# Patient Record
Sex: MALE | Race: BLACK OR AFRICAN AMERICAN | Employment: FULL TIME | URBAN - METROPOLITAN AREA
[De-identification: names, ages, dates, MRNs, and addresses within clinical notes are randomized per-mention and may not be internally consistent; named-entity substitution may affect disease eponyms.]

---

## 2019-12-23 ENCOUNTER — ED HISTORICAL/CONVERTED ENCOUNTER (OUTPATIENT)
Dept: OTHER | Age: 49
End: 2019-12-23

## 2020-05-18 ENCOUNTER — OP HISTORICAL/CONVERTED ENCOUNTER (OUTPATIENT)
Dept: OTHER | Age: 50
End: 2020-05-18

## 2020-06-16 ENCOUNTER — OP HISTORICAL/CONVERTED ENCOUNTER (OUTPATIENT)
Dept: OTHER | Age: 50
End: 2020-06-16

## 2020-06-22 ENCOUNTER — OP HISTORICAL/CONVERTED ENCOUNTER (OUTPATIENT)
Dept: OTHER | Age: 50
End: 2020-06-22

## 2020-07-07 VITALS
SYSTOLIC BLOOD PRESSURE: 145 MMHG | HEIGHT: 69 IN | WEIGHT: 165 LBS | BODY MASS INDEX: 24.44 KG/M2 | DIASTOLIC BLOOD PRESSURE: 98 MMHG | TEMPERATURE: 98.2 F

## 2020-07-07 DIAGNOSIS — K64.8 OTHER HEMORRHOIDS: ICD-10-CM

## 2020-07-07 DIAGNOSIS — C61 MALIGNANT NEOPLASM OF PROSTATE (HCC): ICD-10-CM

## 2020-07-07 DIAGNOSIS — Z80.42 FAMILY HISTORY OF MALIGNANT NEOPLASM OF PROSTATE: ICD-10-CM

## 2020-07-07 DIAGNOSIS — R97.20 ELEVATED PROSTATE SPECIFIC ANTIGEN (PSA): ICD-10-CM

## 2020-07-07 RX ORDER — DOCUSATE SODIUM 100 MG/1
100 CAPSULE, LIQUID FILLED ORAL 2 TIMES DAILY
COMMUNITY
End: 2021-01-15 | Stop reason: ALTCHOICE

## 2020-07-07 RX ORDER — HYDROCODONE BITARTRATE AND ACETAMINOPHEN 5; 325 MG/1; MG/1
TABLET ORAL
COMMUNITY
End: 2021-01-15 | Stop reason: ALTCHOICE

## 2020-07-07 RX ORDER — FACIAL-BODY WIPES
10 EACH TOPICAL
COMMUNITY
End: 2021-01-15 | Stop reason: ALTCHOICE

## 2020-08-03 DIAGNOSIS — R97.20 ELEVATED PROSTATE SPECIFIC ANTIGEN (PSA): ICD-10-CM

## 2020-08-03 DIAGNOSIS — C61 MALIGNANT NEOPLASM OF PROSTATE (HCC): Primary | ICD-10-CM

## 2020-08-03 PROBLEM — R32 URINARY INCONTINENCE: Status: ACTIVE | Noted: 2020-08-03

## 2020-08-03 PROBLEM — K64.9 HEMORRHOIDS: Status: ACTIVE | Noted: 2020-07-07

## 2020-08-03 PROBLEM — R10.2 PELVIC PAIN: Status: ACTIVE | Noted: 2020-08-03

## 2020-08-04 ENCOUNTER — OFFICE VISIT (OUTPATIENT)
Dept: UROLOGY | Age: 50
End: 2020-08-04
Payer: COMMERCIAL

## 2020-08-04 VITALS — WEIGHT: 155 LBS | TEMPERATURE: 99.6 F | HEIGHT: 69 IN | BODY MASS INDEX: 22.96 KG/M2

## 2020-08-04 DIAGNOSIS — R97.20 ELEVATED PROSTATE SPECIFIC ANTIGEN (PSA): ICD-10-CM

## 2020-08-04 DIAGNOSIS — R10.2 PELVIC PAIN: ICD-10-CM

## 2020-08-04 DIAGNOSIS — K64.9 HEMORRHOIDS, UNSPECIFIED HEMORRHOID TYPE: ICD-10-CM

## 2020-08-04 DIAGNOSIS — R32 URINARY INCONTINENCE, UNSPECIFIED TYPE: ICD-10-CM

## 2020-08-04 DIAGNOSIS — C61 MALIGNANT NEOPLASM OF PROSTATE (HCC): ICD-10-CM

## 2020-08-04 DIAGNOSIS — Z80.42 FAMILY HISTORY OF MALIGNANT NEOPLASM OF PROSTATE: ICD-10-CM

## 2020-08-04 LAB
BILIRUB UR QL STRIP: NEGATIVE
GLUCOSE UR-MCNC: NEGATIVE MG/DL
KETONES P FAST UR STRIP-MCNC: NORMAL MG/DL
PH UR STRIP: 6 [PH] (ref 4.6–8)
PROT UR QL STRIP: NORMAL
PSA SERPL-MCNC: 0.9 NG/ML (ref 0–4)
SP GR UR STRIP: 1.02 (ref 1–1.03)
UA UROBILINOGEN AMB POC: NORMAL (ref 0.2–1)
URINALYSIS CLARITY POC: CLEAR
URINALYSIS COLOR POC: YELLOW
URINE BLOOD POC: NORMAL
URINE LEUKOCYTES POC: NEGATIVE
URINE NITRITES POC: NEGATIVE

## 2020-08-04 PROCEDURE — 99024 POSTOP FOLLOW-UP VISIT: CPT | Performed by: UROLOGY

## 2020-08-04 PROCEDURE — 81003 URINALYSIS AUTO W/O SCOPE: CPT | Performed by: UROLOGY

## 2020-08-04 RX ORDER — FLUCONAZOLE 200 MG/1
200 TABLET ORAL DAILY
Qty: 7 TAB | Refills: 0 | Status: SHIPPED | OUTPATIENT
Start: 2020-08-04 | End: 2020-08-11

## 2020-08-04 RX ORDER — AMITRIPTYLINE HYDROCHLORIDE 10 MG/1
10 TABLET, FILM COATED ORAL
Qty: 30 TAB | Refills: 1 | Status: SHIPPED | OUTPATIENT
Start: 2020-08-04 | End: 2021-01-15 | Stop reason: ALTCHOICE

## 2020-08-04 NOTE — ASSESSMENT & PLAN NOTE
PSA was 67 preop, decreasing. Recheck PSA.      Lab Results   Component Value Date/Time    Prostate Specific Ag 0.9 08/03/2020 10:14 AM

## 2020-08-04 NOTE — PROGRESS NOTES
HISTORY OF PRESENT ILLNESS  Altamese Koyanagi is a 47 y.o. male. Mr. Pradeep Dolan is having a burning discomfort with voiding and reports an almost constant leak. He has hemorrhoid pains. He is moving his bowels after eating. He has burning with urination. Urine culture 7/14/20 negative. U/a today clear. He gets pain up his rectum and buttocks. Advil, tylenol and hydrocodone have not given him adequate relief. PSA has decreased from 67 to 0.9 postop. Problem List  Never Reviewed          Codes Class Noted    Pelvic pain ICD-10-CM: R10.2  ICD-9-CM: [id number]  8/3/2020    Overview Signed 8/3/2020 10:31 AM by Ava Avery NP     He had pain in the pelvis and under the scrotum following prostatectomy, with dysuria. Started on Cipro on 7- (rule out infection). No growth on culture. Urinary incontinence ICD-10-CM: R32  ICD-9-CM: 788.30  8/3/2020    Overview Signed 8/3/2020 10:33 AM by Ava Avery NP     He is s/p prostatectomy on 6-. He has been educated on [employer]. Family history of malignant neoplasm of prostate ICD-10-CM: Z80.42  ICD-9-CM: V16.42  7/7/2020    Overview Addendum 8/3/2020 10:29 AM by Ava Avery NP     He has a family history of prostate cancer- father. His children should get their cancer screenings earlier in life and not delay. PCa screening age 35 for his sons. Early mammogram and exams for their daughter. Hemorrhoids ICD-10-CM: K64.9  ICD-9-CM: 455.6  7/7/2020    Overview Addendum 8/3/2020 10:32 AM by Ava Avery NP     He has difficulty with BM and hemorrhoids. At times he has diarrhea. He often strains and notes blood on the toliet tissue afterward. Malignant neoplasm of prostate [facility]) ICD-10-CM: C61  ICD-9-CM: 087  7/7/2020    Overview Addendum 8/4/2020 10:33 AM by Ava Avery NP     Biopsy 5/1/2020 with Jaz's 7 (3+4 and 4+3) disease in 11/12 cores with up to 90% involvement.  Clinical stage T2.  CT scan and bones scan done 5/18/2020 with no evidence of metastatic disease. He is s/p prostatectomy and PLND on 6/22/20. Pathology reports Jaz's 4+5 disease with extensive bilateral involvement (80%) with extension into the seminal vesicle, and the extraprostatic soft tissue, with a positive posterior margin (bilaterally) as well as invasion into the bladder base margins. Clear lymph nodes. Adjuvant therapy with ADT recommended. PSA 8/3/2020 was 0.9                 Elevated prostate specific antigen (PSA) ICD-10-CM: R97.20  ICD-9-CM: 790.93  7/7/2020    Overview Signed 7/7/2020  3:06 PM by Bo Ron LPN     Referred from Dr. Sindhu Vasquez. PSA on 2/24/2020 is reported at 65.4 NG/ml. PSA on 4/21/2020 is 67.8 NG/ml, free 8.8%. He is s/p prostatectomy prostatectomy and PLND on 6/22/20. Pathology reports Jaz's 4+5 disease with extensive bilateral involvement (80%) with extension into the seminal vesicle, and the extraprostatic soft tissue, with a positive posterior margin (bilaterally) as well as invasion into the bladder base margins. Clear lymph nodes. Adjuvant therapy with ADT recommended. PSA 8/3/2020 was 0.9                    Review of Systems   Gastrointestinal:        Per HPI   Genitourinary:        Per HPI   All other systems reviewed and are negative. Physical Exam  Vitals signs reviewed. Constitutional:       General: He is not in acute distress. Appearance: Normal appearance. HENT:      Head: Normocephalic and atraumatic. Nose: Nose normal.      Mouth/Throat:      Mouth: Mucous membranes are moist.   Eyes:      Extraocular Movements: Extraocular movements intact. Pupils: Pupils are equal, round, and reactive to light. Neck:      Musculoskeletal: Normal range of motion. Pulmonary:      Effort: Pulmonary effort is normal. No respiratory distress. Breath sounds: Normal breath sounds. No wheezing or rhonchi.    Abdominal:      General: There is no distension. Palpations: Abdomen is soft. Musculoskeletal: Normal range of motion. Skin:     General: Skin is warm and dry. Neurological:      General: No focal deficit present. Mental Status: He is alert and oriented to person, place, and time. Mental status is at baseline. Psychiatric:         Mood and Affect: Mood normal.         Behavior: Behavior normal.           ASSESSMENT and PLAN  Diagnoses and all orders for this visit:    1. Malignant neoplasm of prostate [facility])  Assessment & Plan:    PSA was 67 preop, decreasing. Recheck PSA. Lab Results   Component Value Date/Time    Prostate Specific Ag 0.9 08/03/2020 10:14 AM       Orders:  -     PSA, DIAGNOSTIC (PROSTATE SPECIFIC AG)  -     amitriptyline (ELAVIL) 10 mg tablet; Take 1 Tab by mouth nightly. Indications: neuropathic pain    2. Urinary incontinence, unspecified type    3. Pelvic pain  Assessment & Plan: We will try imipramine for neuropathic pain. He has GI related pain as well. I will send him for a GI referral.         Orders:  -     amitriptyline (ELAVIL) 10 mg tablet; Take 1 Tab by mouth nightly. Indications: neuropathic pain    4. Family history of malignant neoplasm of prostate    5. Hemorrhoids, unspecified hemorrhoid type    6. Elevated prostate specific antigen (PSA)  Assessment & Plan:  Decreased but still elevated postop. I will recheck in 1m. Other orders  -     fluconazole (DIFLUCAN) 200 mg tablet; Take 1 Tab by mouth daily for 7 days. FDA advises cautious prescribing of oral fluconazole in pregnancy.        Ara Dandy, MD

## 2020-08-04 NOTE — ASSESSMENT & PLAN NOTE
We will try amitryptiline for neuropathic pain. He has GI related pain as well. I will send him for a GI referral.     We discussed cystoscopy to evaluate for urethral issues.

## 2020-08-18 VITALS
BODY MASS INDEX: 24.53 KG/M2 | HEIGHT: 69 IN | WEIGHT: 165.6 LBS | SYSTOLIC BLOOD PRESSURE: 145 MMHG | DIASTOLIC BLOOD PRESSURE: 98 MMHG | OXYGEN SATURATION: 98 % | HEART RATE: 98 BPM

## 2020-08-18 PROBLEM — Z82.49 FAMILY HISTORY OF CARDIOVASCULAR DISEASE: Status: ACTIVE | Noted: 2020-08-18

## 2020-08-18 PROBLEM — R03.0 ELEVATED BLOOD PRESSURE READING WITHOUT DIAGNOSIS OF HYPERTENSION: Status: ACTIVE | Noted: 2020-08-18

## 2020-08-18 PROBLEM — Z83.3 FAMILY HISTORY OF TYPE 2 DIABETES MELLITUS: Status: ACTIVE | Noted: 2020-08-18

## 2020-08-18 PROBLEM — E87.6 HYPOKALEMIA: Status: ACTIVE | Noted: 2020-08-18

## 2020-08-18 PROBLEM — F10.90 ALCOHOL INTAKE ABOVE RECOMMENDED SENSIBLE LIMITS: Status: ACTIVE | Noted: 2020-08-18

## 2020-08-18 PROBLEM — F10.10 ALCOHOL ABUSE, CONTINUOUS DRINKING BEHAVIOR: Status: ACTIVE | Noted: 2020-08-18

## 2020-08-18 PROBLEM — Z82.3 FAMILY HISTORY OF STROKE: Status: ACTIVE | Noted: 2020-08-18

## 2020-08-18 PROBLEM — F17.200 NICOTINE DEPENDENCE: Status: ACTIVE | Noted: 2020-08-18

## 2020-08-18 PROBLEM — Z13.220 SCREENING CHOLESTEROL LEVEL: Status: ACTIVE | Noted: 2020-08-18

## 2020-08-18 RX ORDER — CEPHALEXIN 500 MG/1
500 CAPSULE ORAL 4 TIMES DAILY
COMMUNITY
End: 2021-01-15 | Stop reason: ALTCHOICE

## 2020-08-18 RX ORDER — ISOPROPYL ALCOHOL IN GLYCERIN 95 %-5 %
DROPS OTIC (EAR)
COMMUNITY
End: 2022-01-12 | Stop reason: ALTCHOICE

## 2020-08-18 RX ORDER — SODIUM PHOSPHATE,MONO-DIBASIC 19G-7G/118
1 ENEMA (ML) RECTAL
COMMUNITY
End: 2021-01-15 | Stop reason: ALTCHOICE

## 2020-08-18 RX ORDER — OMEPRAZOLE 40 MG/1
40 CAPSULE, DELAYED RELEASE ORAL DAILY
COMMUNITY
End: 2021-01-15 | Stop reason: ALTCHOICE

## 2020-08-18 RX ORDER — CIPROFLOXACIN 500 MG/1
TABLET ORAL 2 TIMES DAILY
COMMUNITY
End: 2021-01-15 | Stop reason: ALTCHOICE

## 2020-09-05 LAB — PSA SERPL-MCNC: 1.3 NG/ML (ref 0–4)

## 2020-09-08 ENCOUNTER — OFFICE VISIT (OUTPATIENT)
Dept: UROLOGY | Age: 50
End: 2020-09-08
Payer: COMMERCIAL

## 2020-09-08 VITALS — TEMPERATURE: 97.8 F | HEIGHT: 69 IN | BODY MASS INDEX: 23.7 KG/M2 | WEIGHT: 160 LBS

## 2020-09-08 DIAGNOSIS — Z80.42 FAMILY HISTORY OF PROSTATE CANCER: ICD-10-CM

## 2020-09-08 DIAGNOSIS — R97.21 INCREASING PROSTATE SPECIFIC ANTIGEN (PSA) LEVEL AFTER TREATMENT FOR MALIGNANT NEOPLASM OF PROSTATE: ICD-10-CM

## 2020-09-08 DIAGNOSIS — C61 MALIGNANT NEOPLASM OF PROSTATE (HCC): Primary | ICD-10-CM

## 2020-09-08 DIAGNOSIS — R32 URINARY INCONTINENCE, UNSPECIFIED TYPE: ICD-10-CM

## 2020-09-08 LAB
AVG FLOW RATE POC: 4 ML/SECONDS
BILIRUB UR QL STRIP: NEGATIVE
FLOW TIME POC: 33 SECONDS
GLUCOSE UR-MCNC: NEGATIVE MG/DL
KETONES P FAST UR STRIP-MCNC: NEGATIVE MG/DL
MAX FLOW RATE POC: 5 ML/SECONDS
PH UR STRIP: 5.5 [PH] (ref 4.6–8)
PROT UR QL STRIP: NORMAL
PVR POC: 0 CC
SP GR UR STRIP: 1.02 (ref 1–1.03)
TIME TO MAX, POC: 4 SECONDS
UA UROBILINOGEN AMB POC: NORMAL (ref 0.2–1)
URINALYSIS CLARITY POC: CLEAR
URINALYSIS COLOR POC: YELLOW
URINE BLOOD POC: NEGATIVE
URINE LEUKOCYTES POC: NEGATIVE
URINE NITRITES POC: NEGATIVE
VOIDED VOLUME POC: 134 ML
VOIDING TIME POC: 34 SECONDS

## 2020-09-08 PROCEDURE — 51741 ELECTRO-UROFLOWMETRY FIRST: CPT | Performed by: UROLOGY

## 2020-09-08 PROCEDURE — 52000 CYSTOURETHROSCOPY: CPT | Performed by: UROLOGY

## 2020-09-08 PROCEDURE — 51798 US URINE CAPACITY MEASURE: CPT | Performed by: UROLOGY

## 2020-09-08 PROCEDURE — 96402 CHEMO HORMON ANTINEOPL SQ/IM: CPT

## 2020-09-08 PROCEDURE — 51726 COMPLEX CYSTOMETROGRAM: CPT | Performed by: UROLOGY

## 2020-09-08 PROCEDURE — 81003 URINALYSIS AUTO W/O SCOPE: CPT | Performed by: UROLOGY

## 2020-09-08 NOTE — PATIENT INSTRUCTIONS
ADT  Androgen Deprivation Therapy  What you need to know to get started    What is ADT?  ADT is a type of hormone therapy (also called androgen deprivation therapy or ADT) is part of the standard of care for advanced and metastatic prostate cancer.  Androgen: male sex hormone, such as testosterone.  ADT therapy usually consists of a shot/injection that lowers your testosterone levels. It is typically given every 4 months (there are other dosing schedules, depending on the formulation). How does it work for Prostate Cancer?  Prostate cancer cells usually require androgen hormones, such as testosterone, to grow. ADT reduces the levels of androgen hormones to prevent the prostate cancer cells from growing or spreading.  Your brain produces a substance called LHRH (a hormone) that activates another hormone, LH. LH travels to the testicles and causes the formation of testosterone.  ADT lowers the amount of testosterone made by the testicles by inhibiting the formation of LH in the brain causing the testicles not to produce testosterone. These drugs are called LHRH agonists: leuprolide (Lupron), goserelin (Zoladex), triptorelin (Trelstar), and histrelin (Vantas or Supprelin LA).  Drugs called LHRH antagonists prevent LHRH from binding to receptors in order to create LH. With no LH, the testicles stop producing testosterone. Degarelix or Edgar Tho is an example of this drug. It's dosing schedule is different (usually monthly, or every 28 days). How do I know if it is working? Your provider will continue to check a PSA level every few months (typically with your injections). An increase in PSA may indicated that your cancer has started growing again. If your ADT is keeping your androgen levels low and your PSA is going up, that let's your provider know that your cancer is resistant to ADT. You may require additional treatment at that time. What is castrate-resistant prostate cancer?    Many prostate cancers will respond to ADT at first, but eventually then stop responding to this treatment. When this happens, the cancer is considered castrate resistant. These types of prostate cancers can grow despite low levels of androgens. If you have a castrate resistant prostate cancer, additional treatment is required. You will likely stay on your ADT to avoid the increase in testosterone level, which can lead to tumor progression. Side effects of ADT:  Side effects can be managed/treated.  Loss of interest in sex (low libido)   Erectile dysfunction   Hot flashes   Loss of muscle mass and physical strength   Changes in insulin resistance or blood lipids   Weight gain   Mood swings   Fatigue   Growth of breast tissue   Loss of bone density or bone fractures    What is Prosteon?  Bone health supplement used during ADT therapy to provide nutrient support.  Contains calcium citrate, high dose vitamin D3, vitamin K2, magnesium and other minerals that promote healthy bones.  Prosteon contains a high dose of vitamin D3, which aids in the absorption of calcium and calcium citrate, which is more bioavailable (easier for your body to absorb and use) in comparison to other supplements.  Take 2 tablets, twice daily, or as recommended by your provider. Take with food.  Meant for daily, long term use.  There are drug interactions. Make sure to discuss your current medication list with your provider.  There are no side effects associated with Prosteon.  Artificial, dye, and gluten free.  https://[employer]/products/prosteon-bone-health-supplement   Use code 80045 for a discount    
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
## 2020-09-08 NOTE — ASSESSMENT & PLAN NOTE
Lab Results   Component Value Date/Time    Prostate Specific Ag 1.3 09/04/2020 03:14 PM     PSA detectable and increasing after prostatectomy. I recommend adjuvant therapy with ADT.

## 2020-09-08 NOTE — ASSESSMENT & PLAN NOTE
Doing better with Kegels. Improved after bowels moving better. Slow stream but emptying well. No residual pelvic pain.

## 2020-09-17 PROBLEM — Z13.220 SCREENING CHOLESTEROL LEVEL: Status: RESOLVED | Noted: 2020-08-18 | Resolved: 2020-09-17

## 2021-01-11 ENCOUNTER — TELEPHONE (OUTPATIENT)
Dept: UROLOGY | Age: 51
End: 2021-01-11

## 2021-01-11 DIAGNOSIS — C61 MALIGNANT NEOPLASM OF PROSTATE (HCC): Primary | ICD-10-CM

## 2021-01-12 LAB — PSA SERPL-MCNC: <0.1 NG/ML (ref 0–4)

## 2021-01-15 ENCOUNTER — OFFICE VISIT (OUTPATIENT)
Dept: UROLOGY | Age: 51
End: 2021-01-15
Payer: COMMERCIAL

## 2021-01-15 VITALS — TEMPERATURE: 97.1 F | HEIGHT: 70 IN | BODY MASS INDEX: 22.9 KG/M2 | WEIGHT: 160 LBS

## 2021-01-15 DIAGNOSIS — R32 URINARY INCONTINENCE, UNSPECIFIED TYPE: ICD-10-CM

## 2021-01-15 DIAGNOSIS — N52.9 IMPOTENCE: ICD-10-CM

## 2021-01-15 DIAGNOSIS — C61 MALIGNANT NEOPLASM OF PROSTATE (HCC): Primary | ICD-10-CM

## 2021-01-15 DIAGNOSIS — R23.2 HOT FLASHES: ICD-10-CM

## 2021-01-15 DIAGNOSIS — K64.9 HEMORRHOIDS, UNSPECIFIED HEMORRHOID TYPE: ICD-10-CM

## 2021-01-15 DIAGNOSIS — R97.21 INCREASING PROSTATE SPECIFIC ANTIGEN (PSA) LEVEL AFTER TREATMENT FOR MALIGNANT NEOPLASM OF PROSTATE: ICD-10-CM

## 2021-01-15 PROCEDURE — 96402 CHEMO HORMON ANTINEOPL SQ/IM: CPT | Performed by: NURSE PRACTITIONER

## 2021-01-15 RX ORDER — SILDENAFIL 100 MG/1
100 TABLET, FILM COATED ORAL AS NEEDED
Qty: 15 TAB | Refills: 3 | Status: SHIPPED | OUTPATIENT
Start: 2021-01-15 | End: 2021-11-21
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
## 2021-01-15 NOTE — PATIENT INSTRUCTIONS
ADT 
Androgen Deprivation Therapy What you need to know to get started What is ADT? ? ADT is a type of hormone therapy (also called androgen deprivation therapy or ADT) is part of the standard of care for advanced and metastatic prostate cancer. ? Androgen: male sex hormone, such as testosterone. ? ADT therapy usually consists of a shot/injection that lowers your testosterone levels. It is typically given every 3 months (there are other dosing schedules, depending on the formulation). How does it work for Prostate Cancer? ? Prostate cancer cells usually require androgen hormones, such as testosterone, to grow. ADT reduces the levels of androgen hormones to prevent the prostate cancer cells from growing or spreading. ? Your brain produces a substance called LHRH (a hormone) that activates another hormone, LH. LH travels to the testicles and causes the formation of testosterone. ? ADT lowers the amount of testosterone made by the testicles by inhibiting the formation of LH in the brain causing the testicles not to produce testosterone. These drugs are called LHRH agonists: leuprolide (Lupron), goserelin (Zoladex), triptorelin (Trelstar), and histrelin (Vantas or Supprelin LA). ? Drugs called LHRH antagonists prevent LHRH from binding to receptors in order to create LH. With no LH, the testicles stop producing testosterone. Degarelix or Viviann Netter is an example of this drug. It's dosing schedule is different (usually monthly, or every 28 days). How do I know if it is working? Your provider will continue to check a PSA level every few months (typically with your injections). An increase in PSA may indicated that your cancer has started growing again. If your ADT is keeping your androgen levels low and your PSA is going up, that let's your provider know that your cancer is resistant to ADT. You may require additional treatment at that time. What is castrate-resistant prostate cancer? Many prostate cancers will respond to ADT at first, but eventually then stop responding to this treatment. When this happens, the cancer is considered castrate resistant. These types of prostate cancers can grow despite low levels of androgens. If you have a castrate resistant prostate cancer, additional treatment is required. You will likely stay on your ADT to avoid the increase in testosterone level, which can lead to tumor progression. Side effects of ADT: Side effects can be managed/treated. ? Loss of interest in sex (low libido) ? Erectile dysfunction ? Hot flashes ? Loss of muscle mass and physical strength ? Changes in insulin resistance or blood lipids ? Weight gain ? Mood swings ? Fatigue ? Growth of breast tissue ? Loss of bone density or bone fractures What is Prosteon? ? Bone health supplement used during ADT therapy to provide nutrient support. ? Contains calcium citrate, high dose vitamin D3, vitamin K2, magnesium and other minerals that promote healthy bones. ? Prosteon contains a high dose of vitamin D3, which aids in the absorption of calcium and calcium citrate, which is more bioavailable (easier for your body to absorb and use) in comparison to other supplements. ? Take 2 tablets, twice daily, or as recommended by your provider. Take with food. ? Meant for daily, long term use. ? There are drug interactions. Make sure to discuss your current medication list with your provider. ? There are no side effects associated with Prosteon. ? Artificial, dye, and gluten free. ? https://1-[employer] All/products/prosteon-bone-health-supplement ? Use [id number] for a discount What is Megace? ? A man-made derivative of the naturally occurring steroid hormone progesterone. ? Can help to treat hot flashes in men on ADT. ? Minimal side effects.

## 2021-01-15 NOTE — ASSESSMENT & PLAN NOTE
He was referred to Dr. Anthony Cosme, GI. His screening colonoscopy has been postponed secondary to rising COVID concerns per his report. He notes Dr. Anthony Cosme did give him a RX that has significantly helped his symptoms.

## 2021-01-17 PROBLEM — R23.2 HOT FLASHES: Status: ACTIVE | Noted: 2021-01-17

## 2021-01-17 PROBLEM — N52.9 IMPOTENCE: Status: ACTIVE | Noted: 2021-01-17

## 2021-01-17 NOTE — ASSESSMENT & PLAN NOTE
PSA undetectable now on Lupron (started 9/8/2020). He is on the 4 month dosing schedule. Plan for injection today with f/u in 4 months/PSA prior.

## 2021-01-17 NOTE — ASSESSMENT & PLAN NOTE
He feels he has great control. He does use a pad for safety but only rarely has a small volume leak. He drinks water all day, so he voids often. He is cautious not to prolong his urge.

## 2021-01-17 NOTE — ASSESSMENT & PLAN NOTE
He is functioning sexually. He notes he is able to have penetrative intercourse most times. He thinks his tumescence ranges from 60-70%. He inquires about sildenafil or \"male enhancement drugs\" he has seen commercials for. We discussed the importance of avoiding such drugs as they may/may not contain testosterone which is contraindicated secondary to his prostate cancer. He expresses understanding. He is given a RX for sildenafil 100 mg. He is encouraged to start with 50 mg and titrate up if necessary. We discussed the new medication, potential side effects (ranging from congestion, headache, flushing to priapism). We discussed proper dosing of the medication and reviewed his medical history and current medications to identify any contraindications. He expressed understanding. He will call me if he has any problems with the new prescription. His wife is also on the phone for this discussion.

## 2021-01-17 NOTE — ASSESSMENT & PLAN NOTE
Tolerable. He may try soy milk. He is more bothered by weight gain and is cautioned about avoiding sugary/fatty foods.

## 2021-05-15 DIAGNOSIS — C61 MALIGNANT NEOPLASM OF PROSTATE (HCC): ICD-10-CM

## 2021-05-17 ENCOUNTER — TELEPHONE (OUTPATIENT)
Dept: UROLOGY | Age: 51
End: 2021-05-17

## 2021-05-17 NOTE — TELEPHONE ENCOUNTER
Tried getting Lupron Auth: was told that patients insurance covers Pharmacy but Lipron needs medical coverage becuz its injected in the Dr's office/buy and bill. Im trying to get ahold of patient to see if they have new insurance.

## 2021-05-17 NOTE — TELEPHONE ENCOUNTER
Spoke with patient wife needing to get husbands updated insurance information and she gave me the ID number and type of insurance.  She said it was the same one that he has always had

## 2021-05-17 NOTE — TELEPHONE ENCOUNTER
Called member services, this patient ha pharmacy coverage not medical.  Not sure this is correct, how can one have pharmacy covererage without medical covereage and have an apt?   Jackie said she would try tomorrow 5/18/21

## 2021-05-18 LAB — PSA SERPL-MCNC: 0.1 NG/ML (ref 0–4)

## 2021-05-24 NOTE — PROGRESS NOTES
HISTORY OF PRESENT ILLNESS  Danielito Alvarado is a 45 y.o. male. Chief Complaint   Patient presents with    Follow-up    Prostate Cancer    Elevated PSA     He is s/p prostatectomy and PLND on 6/22/20. Pathology reports Addison's 4+5 disease with extensive bilateral involvement (80%) with extension into the seminal vesicle, and the extraprostatic soft tissue, with a positive posterior margin (bilaterally) as well as invasion into the bladder base margins. Clear lymph nodes. Adjuvant therapy with ADT recommended. Started Lupron on 9/8/2020. PSA 8/3/2020 was 0.9  PSA 9/4/2020: 1.3  PSA 1/11/2021: undetectable. PSA 5/17/21: 0.1    He notes he \"hates\" the lupron injections. He tells me he was unable to sit correctly after the last one and had buttock pain x 2 weeks following administration. He does not know if he had a knot or inflammation at the site. He did respond to sildenafil. He may want to pursue other options in the future but he and his wife are happy at this time. Chronic Conditions Addressed Today     1. Malignant neoplasm of prostate [facility]) - Primary     Overview      Biopsy 5/1/2020 with Jaz's 7 (3+4 and 4+3) disease in 11/12 cores with up to 90% involvement. Clinical stage T2.  CT scan and bones scan done 5/18/2020 with no evidence of metastatic disease. He is s/p prostatectomy and PLND on 6/22/20. Pathology reports Addison's 4+5 disease with extensive bilateral involvement (80%) with extension into the seminal vesicle, and the extraprostatic soft tissue, with a positive posterior margin (bilaterally) as well as invasion into the bladder base margins. Clear lymph nodes. Adjuvant therapy with ADT recommended. Started Lupron on 9/8/2020. PSA 8/3/2020 was 0.9  PSA 9/4/2020: 1.3  PSA 1/11/2021: undetectable. PSA 5/17/21: 0.1              Current Assessment & Plan      PSA detectable on Lupron at 0.1. Continue observation for now. Lupron injection today, repeat PSA in 4 months. 2. Increasing prostate specific antigen (PSA) level after treatment for malignant neoplasm of prostate     Overview      Referred from Dr. Yevonne Gitelman. PSA on 2/24/2020 is reported at 65.4 NG/ml. PSA on 4/21/2020 is 67.8 NG/ml, free 8.8%. He is s/p prostatectomy and PLND on 6/22/20. PSA 1.3 9/4/2020  Started Lupron on 9/8/2020. PSA 1/11/2021: undetectable. Current Assessment & Plan      Continue Lupron. Observe PSA for second recurrent value. PSA and f/u in 3 months. 3. Urinary incontinence     Overview      He is s/p prostatectomy on 6-. He has been educated on kegels. 1/17/21: great control, no pad usage. Rare, small volume leak. He does not prolong the urge. 4. Impotence     Overview      1/17/2021: He is functioning sexually. He notes he is able to have penetrative intercourse most times. He thinks his tumescence ranges from 60-70%. He inquires about sildenafil or \"male enhancement drugs\" he has seen commercials for. We discussed the importance of avoiding such drugs as they may/may not contain testosterone which is contraindicated secondary to his prostate cancer. He expresses understanding. He was given a RX for sildenafil 100 mg. Instructed to start at 50 mg and titrate upward if necessary. Patient denies the symptoms of COVID-19 per routine screening guidelines.   ROS    Past Medical History:  PMHx (including negatives):  has a past medical history of Alcohol abuse, continuous drinking behavior (8/18/2020), Alcohol intake above recommended sensible limits (8/18/2020), Elevated blood pressure reading without diagnosis of hypertension (8/18/2020), Elevated prostate specific antigen (PSA) (7/7/2020), Family history of cardiovascular disease (8/18/2020), Family history of malignant neoplasm of prostate (7/7/2020), Family history of stroke (8/18/2020), Family history of type 2 diabetes mellitus (8/18/2020), Hypokalemia (8/18/2020), Malignant neoplasm of prostate ([facility] [address] (7/7/2020), Nicotine dependence (8/18/2020), Other hemorrhoids (7/7/2020), and Screening cholesterol level (8/18/2020). PSurgHx:  has a past surgical history that includes hx hip arthroscopy; ir bx lymph node superficial (06/22/2020); hx radical prostatectomy; biopsy prostate; hx hip replacement; hx amputation toe; and hx urological (09/08/2020). PSocHx:  reports that he has been smoking. He has a 10.00 pack-year smoking history. He has never used smokeless tobacco. He reports current alcohol use of about 10.0 standard drinks of alcohol per week. He reports current drug use. Drug: Marijuana. ASSESSMENT and PLAN  Diagnoses and all orders for this visit:    1. Malignant neoplasm of prostate [facility])  Assessment & Plan:  PSA detectable on Lupron at 0.1. Continue observation for now. Lupron injection today, repeat PSA in 4 months. He should call/come back to the office if he has a localized reaction to the injection or if the injection site is bothersome in any manner. He expressed understanding. Orders:  -     PSA, DIAGNOSTIC (PROSTATE SPECIFIC AG); Future    2. Urinary incontinence, unspecified type  Assessment & Plan:  Stable, no issue with control. 3. Impotence  Assessment & Plan:  He did well with the sildenafil. He may want to explore other alternative options in the future. 4. Increasing prostate specific antigen (PSA) level after treatment for malignant neoplasm of prostate  Assessment & Plan:  Continue Lupron. Observe PSA for second recurrent value. PSA and f/u in 3 months. Follow-up and Dispositions    · Return in about 4 months (around 9/26/2021) for with Dr. John Sy, Lupron, PSA prior.          Jacinto Torres NP

## 2021-05-24 NOTE — ASSESSMENT & PLAN NOTE
PSA detectable on Lupron at 0.1. Continue observation for now. Lupron injection today, repeat PSA in 4 months. He should call/come back to the office if he has a localized reaction to the injection or if the injection site is bothersome in any manner. He expressed understanding.

## 2021-05-26 ENCOUNTER — OFFICE VISIT (OUTPATIENT)
Dept: UROLOGY | Age: 51
End: 2021-05-26
Payer: COMMERCIAL

## 2021-05-26 VITALS
RESPIRATION RATE: 12 BRPM | SYSTOLIC BLOOD PRESSURE: 122 MMHG | OXYGEN SATURATION: 96 % | TEMPERATURE: 96.4 F | HEART RATE: 113 BPM | HEIGHT: 70 IN | WEIGHT: 160 LBS | BODY MASS INDEX: 22.9 KG/M2 | DIASTOLIC BLOOD PRESSURE: 81 MMHG

## 2021-05-26 DIAGNOSIS — C61 MALIGNANT NEOPLASM OF PROSTATE (HCC): Primary | ICD-10-CM

## 2021-05-26 DIAGNOSIS — R97.21 INCREASING PROSTATE SPECIFIC ANTIGEN (PSA) LEVEL AFTER TREATMENT FOR MALIGNANT NEOPLASM OF PROSTATE: ICD-10-CM

## 2021-05-26 DIAGNOSIS — N52.9 IMPOTENCE: ICD-10-CM

## 2021-05-26 DIAGNOSIS — R32 URINARY INCONTINENCE, UNSPECIFIED TYPE: ICD-10-CM

## 2021-05-26 PROCEDURE — 96402 CHEMO HORMON ANTINEOPL SQ/IM: CPT | Performed by: NURSE PRACTITIONER

## 2021-05-26 NOTE — PROGRESS NOTES
Chief Complaint   Patient presents with    Follow-up    Prostate Cancer    Elevated PSA       1. Have you been to the ER, urgent care clinic since your last visit? Hospitalized since your last visit? No    2. Have you seen or consulted any other health care providers outside of the [address] [place] [address] since your last visit? Include any pap smears or colon screening.  No    Visit Vitals  /81 (BP 1 Location: Left upper arm, BP Patient Position: Sitting, BP Cuff Size: Adult)   Pulse (!) 113   Temp (!) 96.4 °F (35.8 °C) (Temporal)   Resp 12   Ht 5' 10\" (1.778 m)   Wt 160 lb (72.6 kg)   SpO2 96%   BMI 22.96 kg/m²

## 2021-09-24 LAB — PSA SERPL-MCNC: 1.1 NG/ML (ref 0–4)

## 2021-09-27 DIAGNOSIS — C61 MALIGNANT NEOPLASM OF PROSTATE (HCC): ICD-10-CM

## 2021-09-28 ENCOUNTER — OFFICE VISIT (OUTPATIENT)
Dept: UROLOGY | Age: 51
End: 2021-09-28
Payer: COMMERCIAL

## 2021-09-28 VITALS
BODY MASS INDEX: 24.2 KG/M2 | DIASTOLIC BLOOD PRESSURE: 98 MMHG | HEIGHT: 70 IN | WEIGHT: 169 LBS | SYSTOLIC BLOOD PRESSURE: 180 MMHG | RESPIRATION RATE: 22 BRPM | OXYGEN SATURATION: 98 % | TEMPERATURE: 96.5 F | HEART RATE: 98 BPM

## 2021-09-28 DIAGNOSIS — R32 URINARY INCONTINENCE, UNSPECIFIED TYPE: ICD-10-CM

## 2021-09-28 DIAGNOSIS — R97.21 INCREASING PROSTATE SPECIFIC ANTIGEN (PSA) LEVEL AFTER TREATMENT FOR MALIGNANT NEOPLASM OF PROSTATE: ICD-10-CM

## 2021-09-28 DIAGNOSIS — C61 MALIGNANT NEOPLASM OF PROSTATE (HCC): Primary | ICD-10-CM

## 2021-09-28 DIAGNOSIS — N52.9 IMPOTENCE: ICD-10-CM

## 2021-09-28 DIAGNOSIS — R23.2 HOT FLASHES: ICD-10-CM

## 2021-09-28 LAB
BILIRUB UR QL STRIP: NEGATIVE
GLUCOSE UR-MCNC: NEGATIVE MG/DL
KETONES P FAST UR STRIP-MCNC: NEGATIVE MG/DL
PH UR STRIP: 6 [PH] (ref 4.6–8)
PROT UR QL STRIP: NEGATIVE
SP GR UR STRIP: 1.03 (ref 1–1.03)
UA UROBILINOGEN AMB POC: NORMAL (ref 0.2–1)
URINALYSIS CLARITY POC: CLEAR
URINALYSIS COLOR POC: YELLOW
URINE BLOOD POC: NEGATIVE
URINE LEUKOCYTES POC: NEGATIVE
URINE NITRITES POC: NEGATIVE

## 2021-09-28 PROCEDURE — 99214 OFFICE O/P EST MOD 30 MIN: CPT | Performed by: UROLOGY

## 2021-09-28 PROCEDURE — 81003 URINALYSIS AUTO W/O SCOPE: CPT | Performed by: UROLOGY

## 2021-09-28 NOTE — PROGRESS NOTES
1. Have you been to the ER, urgent care clinic since your last visit? Hospitalized since your last visit? No    2. Have you seen or consulted any other health care providers outside of the [address] [place] [address] since your last visit? Include any pap smears or colon screening.  No  Chief Complaint   Patient presents with    Follow-up    Prostate Cancer    Elevated PSA     Visit Vitals  BP (!) 180/98 (BP 1 Location: Left arm, BP Patient Position: Sitting, BP Cuff Size: Adult)   Pulse 98   Temp (!) 96.5 °F (35.8 °C) (Temporal)   Resp 22   Ht 5' 10\" (1.778 m)   Wt 169 lb (76.7 kg)   SpO2 98%   BMI 24.25 kg/m²     PT states he has no PCP

## 2021-09-28 NOTE — ASSESSMENT & PLAN NOTE
He is status post a robotic prostatectomy 6/20/2020 with high risk disease. He had Jaz's 9 disease with extensive local invasion. He started Lupron September 2020. His PSA went undetectable however he had a trace detectable PSA on 5/17/2020 of 0.1. PSA on 9/23/2021 as jumped to 1.1. I recommend continue Lupron. I recommend adjuvant therapy. We discussed starting him on Xtandi. The patient was instructed on the dosing of the medication and reason for taking it. We discussed the common and serious risks. The patient is advised to be cautious of side effects with a new medication.

## 2021-09-28 NOTE — PROGRESS NOTES
HISTORY OF PRESENT ILLNESS  Twyla Segundo is a 45 y.o. male. Chief Complaint   Patient presents with    Follow-up    Prostate Cancer    Elevated PSA     He feels fine. He has mild flashes which are tolerable. He is on Lupron for high risk prostate cancer. PSA is 1.1 after treatment. 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 Chronic Conditions Addressed Today     1. Malignant neoplasm of prostate [facility]) - Primary     Overview      Biopsy 5/1/2020 with Jaz's 7 (3+4 and 4+3) disease in 11/12 cores with up to 90% involvement. Clinical stage T2.  CT scan and bones scan done 5/18/2020 with no evidence of metastatic disease. He is s/p prostatectomy and PLND on 6/22/20. Pathology reports Jaz's 4+5 disease with extensive bilateral involvement (80%) with extension into the seminal vesicle, and the extraprostatic soft tissue, with a positive posterior margin (bilaterally) as well as invasion into the bladder base margins. Clear lymph nodes. Adjuvant therapy with ADT recommended. Started Lupron on 9/8/2020. PSA 8/3/2020 was 0.9  PSA 9/4/2020: 1.3   Started on Lupron 9/8/2020. PSA 1/11/2021: undetectable. PSA 5/17/21: 0.1              Current Assessment & Plan      He is status post a robotic prostatectomy 6/20/2020 with high risk disease. He had Jaz's 9 disease with extensive local invasion. He started Lupron September 2020. His PSA went undetectable however he had a trace detectable PSA on 5/17/2020 of 0.1. PSA on 9/23/2021 as jumped to 1.1. I recommend continue Lupron. I recommend adjuvant therapy. We discussed starting him on Xtandi. The patient was instructed on the dosing of the medication and reason for taking it. We discussed the common and serious risks. The patient is advised to be cautious of side effects with a new medication.             Relevant Medications     leuprolide depot (LUPRON 3 MONTH) injection sykt 22.5 mg (Completed) (Start on 9/28/2021 10:00 AM) Other Relevant Orders     AMB POC URINALYSIS DIP STICK AUTO W/O MICRO (Completed)     PSA, DIAGNOSTIC (PROSTATE SPECIFIC AG)     METABOLIC PANEL, COMPREHENSIVE     CBC WITH AUTOMATED DIFF     TESTOSTERONE, TOTAL, ADULT MALE    2. Increasing prostate specific antigen (PSA) level after treatment for malignant neoplasm of prostate     Overview      Referred from Dr. Deann Elena. PSA on 2/24/2020 is reported at 65.4 NG/ml. PSA on 4/21/2020 is 67.8 NG/ml, free 8.8%. He is s/p prostatectomy and PLND on 6/22/20. PSA 1.3 9/4/2020  Started Lupron on 9/8/2020. PSA 1/11/2021: undetectable. PSA 0.1 5/2021  PSA 1.1 9/23/21          Current Assessment & Plan       PSA recurrence on Lupron. Now castrate resistant prostate cancer          Relevant Medications     leuprolide depot (LUPRON 3 MONTH) injection sykt 22.5 mg (Completed) (Start on 9/28/2021 10:00 AM)    3. Urinary incontinence     Overview      He is s/p prostatectomy on 6-. He has been educated on kegels. 1/17/21: great control, no pad usage. Rare, small volume leak. He does not prolong the urge. Current Assessment & Plan       No persistent postoperative leakage. 4. Impotence     Overview      1/17/2021: He is functioning sexually. He notes he is able to have penetrative intercourse most times. He thinks his tumescence ranges from 60-70%. He inquires about sildenafil or \"male enhancement drugs\" he has seen commercials for. We discussed the importance of avoiding such drugs as they may/may not contain testosterone which is contraindicated secondary to his prostate cancer. He expresses understanding. He was given a RX for sildenafil 100 mg. Instructed to start at 50 mg and titrate upward if necessary. 5/26/21: he has done well on sildenafil  mg. Current Assessment & Plan       He is functional on sildenafil. Continue as needed         5.  Hot flashes     Overview      1/17/21: He has hot flashes on ADT. They are worse in the few weeks before he is due for his next injection. They are tolerable. He is more bothered by weight gain. Past Medical History:    PMHx (including negatives):  has a past medical history of Alcohol abuse, continuous drinking behavior (8/18/2020), Alcohol intake above recommended sensible limits (8/18/2020), Elevated blood pressure reading without diagnosis of hypertension (8/18/2020), Elevated prostate specific antigen (PSA) (7/7/2020), Family history of cardiovascular disease (8/18/2020), Family history of malignant neoplasm of prostate (7/7/2020), Family history of stroke (8/18/2020), Family history of type 2 diabetes mellitus (8/18/2020), Hypokalemia (8/18/2020), Malignant neoplasm of prostate ([facility] [address] (7/7/2020), Nicotine dependence (8/18/2020), Other hemorrhoids (7/7/2020), and Screening cholesterol level (8/18/2020). PSurgHx:  has a past surgical history that includes hx hip arthroscopy; ir bx lymph node superficial (06/22/2020); hx radical prostatectomy; biopsy prostate; hx hip replacement; hx amputation toe; hx urological (09/08/2020); hx orthopaedic (Right, 1985); and pr total hip arthroplasty (Left). PSocHx:  reports that he has been smoking. He has a 10.00 pack-year smoking history. He has never used smokeless tobacco. He reports current alcohol use of about 10.0 standard drinks of alcohol per week. He reports current drug use. Drug: Marijuana. ROS  Physical Exam  No Known Allergies   Prior to Admission medications    Medication Sig Start Date End Date Taking? Authorizing Provider   Aspirin-Caffeine (BC Pain Relief) 845-65 mg pwpk Take  by mouth. Yes Provider, Historical   sildenafil citrate (VIAGRA) 100 mg tablet Take 1 Tab by mouth as needed for Erectile Dysfunction for up to 15 doses. Patient not taking: Reported on 9/28/2021 1/15/21   Perry Saldana NP        ASSESSMENT and PLAN  Diagnoses and all orders for this visit:    1.  Malignant neoplasm of prostate [facility])  Assessment & Plan:  He is status post a robotic prostatectomy 6/20/2020 with high risk disease. He had [place]'s 9 disease with extensive local invasion. He started Lupron September 2020. His PSA went undetectable however he had a trace detectable PSA on 5/17/2020 of 0.1. PSA on 9/23/2021 as jumped to 1.1. I recommend continue Lupron. I recommend adjuvant therapy. We discussed starting him on Xtandi. The patient was instructed on the dosing of the medication and reason for taking it. We discussed the common and serious risks. The patient is advised to be cautious of side effects with a new medication. Orders:  -     AMB POC URINALYSIS DIP STICK AUTO W/O MICRO  -     PSA, DIAGNOSTIC (PROSTATE SPECIFIC AG); Future  -     METABOLIC PANEL, COMPREHENSIVE; Future  -     CBC WITH AUTOMATED DIFF; Future  -     TESTOSTERONE, TOTAL, ADULT MALE; Future  -     leuprolide depot (LUPRON 3 MONTH) injection sykt 22.5 mg    2. Hot flashes    3. Impotence  Assessment & Plan:   He is functional on sildenafil. Continue as needed      4. Increasing prostate specific antigen (PSA) level after treatment for malignant neoplasm of prostate  Assessment & Plan:   PSA recurrence on Lupron. Now castrate resistant prostate cancer    Orders:  -     leuprolide depot (LUPRON 3 MONTH) injection sykt 22.5 mg    5. Urinary incontinence, unspecified type  Assessment & Plan:   No persistent postoperative leakage.            Anna Millan MD

## 2021-10-01 ENCOUNTER — TELEPHONE (OUTPATIENT)
Dept: UROLOGY | Age: 51
End: 2021-10-01

## 2021-10-04 NOTE — TELEPHONE ENCOUNTER
ALL OF THE FORMS HAVE BEEN FILLED OUT ON OUR END.  CAN YOU PROVIDE HER WITH THEIR PHONE NUMBER AND SHE CAN CALL THEM TO CHECK ON STATUS [phone]

## 2021-10-04 NOTE — TELEPHONE ENCOUNTER
pts wife wants to know status of hormone medication, said rahat ( probably spelling wrong)    pls advise

## 2021-10-12 ENCOUNTER — TELEPHONE (OUTPATIENT)
Dept: UROLOGY | Age: 51
End: 2021-10-12

## 2021-10-12 NOTE — TELEPHONE ENCOUNTER
Pt said that medication zandi(probably spelled wrong) was denied by insurance, is there an alternative?    pls advise  Thank you

## 2021-10-15 NOTE — TELEPHONE ENCOUNTER
I completed [employer] form for Mr. Young Barrett. Given to [facility]. Patient needs to take prednisone WITH the Zytiga. Once he receives approval- he should start the prednisone and the Zytiga together. I will need to KNOW when that happens so I can send the steroid to the pharmacy also. I also need to see his labs that were ordered on 9/28/21, particularly his liver function. Please remind him to have this done.

## 2021-10-15 NOTE — TELEPHONE ENCOUNTER
Form filled out and faxed. Please let someone know if you get the confirmation for this medication off printer.

## 2021-11-21 ENCOUNTER — HOSPITAL ENCOUNTER (EMERGENCY)
Age: 51
Discharge: HOME | End: 2021-11-21
Attending: EMERGENCY MEDICINE
Payer: COMMERCIAL

## 2021-11-21 ENCOUNTER — APPOINTMENT (OUTPATIENT)
Dept: GENERAL RADIOLOGY | Age: 51
End: 2021-11-21
Attending: EMERGENCY MEDICINE
Payer: COMMERCIAL

## 2021-11-21 VITALS
OXYGEN SATURATION: 97 % | BODY MASS INDEX: 25.92 KG/M2 | HEART RATE: 101 BPM | HEIGHT: 69 IN | SYSTOLIC BLOOD PRESSURE: 159 MMHG | RESPIRATION RATE: 20 BRPM | DIASTOLIC BLOOD PRESSURE: 105 MMHG | WEIGHT: 175 LBS | TEMPERATURE: 98 F

## 2021-11-21 DIAGNOSIS — J20.9 ACUTE BRONCHITIS, UNSPECIFIED ORGANISM: Primary | ICD-10-CM

## 2021-11-21 DIAGNOSIS — R04.2 COUGH WITH HEMOPTYSIS: ICD-10-CM

## 2021-11-21 PROCEDURE — 74011250637 HC RX REV CODE- 250/637: Performed by: EMERGENCY MEDICINE

## 2021-11-21 PROCEDURE — 71045 X-RAY EXAM CHEST 1 VIEW: CPT

## 2021-11-21 PROCEDURE — 74011636637 HC RX REV CODE- 636/637: Performed by: EMERGENCY MEDICINE

## 2021-11-21 PROCEDURE — 99283 EMERGENCY DEPT VISIT LOW MDM: CPT

## 2021-11-21 RX ORDER — PREDNISONE 20 MG/1
TABLET ORAL
Qty: 12 TABLET | Refills: 0 | Status: SHIPPED | OUTPATIENT
Start: 2021-11-21 | End: 2022-01-12 | Stop reason: ALTCHOICE

## 2021-11-21 RX ORDER — ALBUTEROL SULFATE 90 UG/1
1 AEROSOL, METERED RESPIRATORY (INHALATION)
Qty: 6.7 G | Refills: 1 | Status: SHIPPED | OUTPATIENT
Start: 2021-11-21 | End: 2022-01-12 | Stop reason: ALTCHOICE

## 2021-11-21 RX ORDER — PREDNISONE 20 MG/1
60 TABLET ORAL ONCE
Status: COMPLETED | OUTPATIENT
Start: 2021-11-21 | End: 2021-11-21

## 2021-11-21 RX ORDER — AZITHROMYCIN 250 MG/1
500 TABLET, FILM COATED ORAL
Status: COMPLETED | OUTPATIENT
Start: 2021-11-21 | End: 2021-11-21

## 2021-11-21 RX ORDER — AZITHROMYCIN 250 MG/1
TABLET, FILM COATED ORAL
Qty: 6 TABLET | Refills: 0 | Status: SHIPPED | OUTPATIENT
Start: 2021-11-21 | End: 2022-01-12 | Stop reason: ALTCHOICE

## 2021-11-21 RX ADMIN — AZITHROMYCIN MONOHYDRATE 500 MG: 250 TABLET ORAL at 06:51

## 2021-11-21 RX ADMIN — PREDNISONE 60 MG: 20 TABLET ORAL at 06:51

## 2021-11-21 NOTE — ED TRIAGE NOTES
Pt with cough x 3 or 4 days onset blood in sputum this am, denies fever, negative covd test last week, pt on immunosuppressive therapy for Prostate CA
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
## 2021-12-28 DIAGNOSIS — C61 MALIGNANT NEOPLASM OF PROSTATE (HCC): ICD-10-CM

## 2022-01-06 LAB
ALBUMIN SERPL-MCNC: 5 G/DL (ref 3.8–4.9)
ALBUMIN/GLOB SERPL: 1.9 {RATIO} (ref 1.2–2.2)
ALP SERPL-CCNC: 149 IU/L (ref 44–121)
ALT SERPL-CCNC: 63 IU/L (ref 0–44)
AST SERPL-CCNC: 76 IU/L (ref 0–40)
BASOPHILS # BLD AUTO: 0.1 X10E3/UL (ref 0–0.2)
BASOPHILS NFR BLD AUTO: 2 %
BILIRUB SERPL-MCNC: 0.8 MG/DL (ref 0–1.2)
BUN SERPL-MCNC: 5 MG/DL (ref 6–24)
BUN/CREAT SERPL: 6 (ref 9–20)
CALCIUM SERPL-MCNC: 10.2 MG/DL (ref 8.7–10.2)
CHLORIDE SERPL-SCNC: 102 MMOL/L (ref 96–106)
CO2 SERPL-SCNC: 19 MMOL/L (ref 20–29)
CREAT SERPL-MCNC: 0.79 MG/DL (ref 0.76–1.27)
EOSINOPHIL # BLD AUTO: 0.1 X10E3/UL (ref 0–0.4)
EOSINOPHIL NFR BLD AUTO: 1 %
ERYTHROCYTE [DISTWIDTH] IN BLOOD BY AUTOMATED COUNT: 13.3 % (ref 11.6–15.4)
GLOBULIN SER CALC-MCNC: 2.7 G/DL (ref 1.5–4.5)
GLUCOSE SERPL-MCNC: 84 MG/DL (ref 65–99)
HCT VFR BLD AUTO: 42 % (ref 37.5–51)
HGB BLD-MCNC: 14.7 G/DL (ref 13–17.7)
IMM GRANULOCYTES # BLD AUTO: 0 X10E3/UL (ref 0–0.1)
IMM GRANULOCYTES NFR BLD AUTO: 0 %
LYMPHOCYTES # BLD AUTO: 2.2 X10E3/UL (ref 0.7–3.1)
LYMPHOCYTES NFR BLD AUTO: 27 %
MCH RBC QN AUTO: 33.6 PG (ref 26.6–33)
MCHC RBC AUTO-ENTMCNC: 35 G/DL (ref 31.5–35.7)
MCV RBC AUTO: 96 FL (ref 79–97)
MONOCYTES # BLD AUTO: 0.7 X10E3/UL (ref 0.1–0.9)
MONOCYTES NFR BLD AUTO: 9 %
NEUTROPHILS # BLD AUTO: 5.1 X10E3/UL (ref 1.4–7)
NEUTROPHILS NFR BLD AUTO: 61 %
PLATELET # BLD AUTO: 478 X10E3/UL (ref 150–450)
POTASSIUM SERPL-SCNC: 3.9 MMOL/L (ref 3.5–5.2)
PROT SERPL-MCNC: 7.7 G/DL (ref 6–8.5)
PSA SERPL-MCNC: 3.4 NG/ML (ref 0–4)
RBC # BLD AUTO: 4.38 X10E6/UL (ref 4.14–5.8)
SODIUM SERPL-SCNC: 141 MMOL/L (ref 134–144)
TESTOST SERPL-MCNC: 44 NG/DL (ref 264–916)
WBC # BLD AUTO: 8.3 X10E3/UL (ref 3.4–10.8)

## 2022-01-06 NOTE — PROGRESS NOTES
PSA is elevated; he was supposed be on Zytiga/prednisone. See  previous telephone call messages and let me know where we are in this process. I sent a message to patient on MyChart also. If he is on the medications and PSA is 3.4 he needs a VV with [facility]. If he is not on these meds, we need to get him on them. He is expecting a call from you.     Thanks,   [employer]

## 2022-01-07 NOTE — PROGRESS NOTES
Spoke with PT today in office about PSA level and attempted to calm him down some. He said the INS denied the Zytiga. I explained to him we have yet to get that letter and please keep an open communication with us.  I will call [employer] today and start a appeal. Campbell Farah Scheduled a VV with [facility]

## 2022-01-12 ENCOUNTER — OFFICE VISIT (OUTPATIENT)
Dept: UROLOGY | Age: 52
End: 2022-01-12
Payer: COMMERCIAL

## 2022-01-12 VITALS — HEIGHT: 69 IN | BODY MASS INDEX: 25.18 KG/M2 | WEIGHT: 170 LBS

## 2022-01-12 DIAGNOSIS — C61 MALIGNANT NEOPLASM OF PROSTATE (HCC): ICD-10-CM

## 2022-01-12 DIAGNOSIS — R97.21 INCREASING PROSTATE SPECIFIC ANTIGEN (PSA) LEVEL AFTER TREATMENT FOR MALIGNANT NEOPLASM OF PROSTATE: ICD-10-CM

## 2022-01-12 DIAGNOSIS — R23.2 HOT FLASHES: ICD-10-CM

## 2022-01-12 LAB
BILIRUB UR QL STRIP: NEGATIVE
GLUCOSE UR-MCNC: NEGATIVE MG/DL
KETONES P FAST UR STRIP-MCNC: NORMAL MG/DL
PH UR STRIP: 6 [PH] (ref 4.6–8)
PROT UR QL STRIP: NEGATIVE
SP GR UR STRIP: 1.02 (ref 1–1.03)
UA UROBILINOGEN AMB POC: NORMAL (ref 0.2–1)
URINALYSIS CLARITY POC: CLEAR
URINALYSIS COLOR POC: YELLOW
URINE BLOOD POC: NEGATIVE
URINE LEUKOCYTES POC: NEGATIVE
URINE NITRITES POC: NEGATIVE

## 2022-01-12 PROCEDURE — 99214 OFFICE O/P EST MOD 30 MIN: CPT | Performed by: UROLOGY

## 2022-01-12 PROCEDURE — 81003 URINALYSIS AUTO W/O SCOPE: CPT | Performed by: UROLOGY

## 2022-01-12 RX ORDER — LEUPROLIDE ACETATE 22.5 MG
22.5 KIT INTRAMUSCULAR ONCE
COMMUNITY

## 2022-01-12 RX ORDER — DAROLUTAMIDE 300 MG/1
600 TABLET, FILM COATED ORAL 2 TIMES DAILY WITH MEALS
Qty: 120 TABLET | Refills: 11 | Status: SHIPPED | OUTPATIENT
Start: 2022-01-12 | End: 2022-04-13 | Stop reason: SDUPTHER

## 2022-01-12 NOTE — TELEPHONE ENCOUNTER
Pt states he still hasnt heard from [employer]- for zytiga. Have you seen an email from them? Or can you follow up on this please.  I dont see any denial form or anything scanned in

## 2022-01-12 NOTE — ASSESSMENT & PLAN NOTE
He has PSA recurrence, castrate resistant nm prostate cancer. His insurance did not cover [employer]. We will try another antiandrogen.

## 2022-01-12 NOTE — PROGRESS NOTES
HISTORY OF PRESENT ILLNESS  Monique Tompkins is a 45 y.o. male. has a past medical history of Alcohol abuse, continuous drinking behavior (8/18/2020), Alcohol intake above recommended sensible limits (8/18/2020), Elevated blood pressure reading without diagnosis of hypertension (8/18/2020), Elevated prostate specific antigen (PSA) (7/7/2020), Family history of cardiovascular disease (8/18/2020), Family history of malignant neoplasm of prostate (7/7/2020), Family history of stroke (8/18/2020), Family history of type 2 diabetes mellitus (8/18/2020), Hypokalemia (8/18/2020), Malignant neoplasm of prostate ([facility] [address] (7/7/2020), Nicotine dependence (8/18/2020), Other hemorrhoids (7/7/2020), and Screening cholesterol level (8/18/2020). He has no past medical history of Nicotine vapor product user or Non-nicotine vapor product user. has a past surgical history that includes hx hip arthroscopy; ir bx lymph node superficial (06/22/2020); hx radical prostatectomy; biopsy prostate; hx hip replacement; hx amputation toe; hx urological (09/08/2020); hx orthopaedic (Right, 1985); and pr total hip arthroplasty (Left). Chief Complaint   Patient presents with    [facility]     [facility] [place]     Injection    Erectile Dysfunction    Urinary Incontinence    Elevated PSA    Prostate Cancer     HPI  Chronic Conditions Addressed Today     1. Malignant neoplasm of prostate ([facility] [address]     Overview      Biopsy 5/1/2020 with Walton's 7 (3+4 and 4+3) disease in 11/12 cores with up to 90% involvement. Clinical stage T2.  CT scan and bones scan done 5/18/2020 with no evidence of metastatic disease. He is s/p prostatectomy and PLND on 6/22/20. Pathology reports Walton's 4+5 disease with extensive bilateral involvement (80%) with extension into the seminal vesicle, and the extraprostatic soft tissue, with a positive posterior margin (bilaterally) as well as invasion into the bladder base margins. Clear lymph nodes.   Adjuvant therapy with ADT recommended. Started Lupron on 9/8/2020. PSA 8/3/2020 was 0.9  PSA 9/4/2020: 1.3   Started on Lupron 9/8/2020. PSA 1/11/2021: undetectable. PSA 5/17/21: 0.1  PSA 1/5/11: 3.4             Current Assessment & Plan      PSA recurrence after prostatectomy, on Lupron. Castrate resistant. Check bone scan and CT abd/pel          Relevant Medications     leuprolide depot (Lupron Depot, 3 month,) 22.5 mg injection     darolutamide (Nubeqa) 300 mg tablet     Other Relevant Orders     AMB POC URINALYSIS DIP STICK AUTO W/O MICRO (Completed)     NM BONE SCAN [address] BODY     CT ABD PELV W WO CONT     PSA, DIAGNOSTIC (PROSTATE SPECIFIC AG)    2. Increasing prostate specific antigen (PSA) level after treatment for malignant neoplasm of prostate     Overview      Referred from Dr. Jami Tompkins. PSA on 2/24/2020 is reported at 65.4 NG/ml. PSA on 4/21/2020 is 67.8 NG/ml, free 8.8%. He is s/p prostatectomy and PLND on 6/22/20. PSA 1.3 9/4/2020  Started Lupron on 9/8/2020. PSA 1/11/2021: undetectable. PSA 0.1 5/2021  PSA 1.1 9/23/21          Current Assessment & Plan       He has PSA recurrence, castrate resistant nm prostate cancer. His insurance did not cover Debra Ericr. We will try another antiandrogen. Relevant Orders     CT ABD PELV W WO CONT     PSA, DIAGNOSTIC (PROSTATE SPECIFIC AG)    3. Hot flashes     Overview      1/17/21: He has hot flashes on ADT. They are worse in the few weeks before he is due for his next injection. They are tolerable. He is more bothered by weight gain. Current Assessment & Plan       He is advised to try drinking soy milk. He is also bothered by Lupron injection site pain.            Relevant Orders     PSA, DIAGNOSTIC (PROSTATE SPECIFIC AG)          Past Medical History:    PMHx (including negatives):  has a past medical history of Alcohol abuse, continuous drinking behavior (8/18/2020), Alcohol intake above recommended sensible limits (8/18/2020), Elevated blood pressure reading without diagnosis of hypertension (8/18/2020), Elevated prostate specific antigen (PSA) (7/7/2020), Family history of cardiovascular disease (8/18/2020), Family history of malignant neoplasm of prostate (7/7/2020), Family history of stroke (8/18/2020), Family history of type 2 diabetes mellitus (8/18/2020), Hypokalemia (8/18/2020), Malignant neoplasm of prostate ([address] (7/7/2020), Nicotine dependence (8/18/2020), Other hemorrhoids (7/7/2020), and Screening cholesterol level (8/18/2020). He has no past medical history of Nicotine vapor product user or Non-nicotine vapor product user. PSurgHx:  has a past surgical history that includes hx hip arthroscopy; ir bx lymph node superficial (06/22/2020); hx radical prostatectomy; biopsy prostate; hx hip replacement; hx amputation toe; hx urological (09/08/2020); hx orthopaedic (Right, 1985); and pr total hip arthroplasty (Left). PSocHx:  reports that he has quit smoking. He has a 10.00 pack-year smoking history. He has never used smokeless tobacco. He reports current alcohol use of about 10.0 standard drinks of alcohol per week. He reports previous drug use. Drug: Marijuana. ROS  Physical Exam  No Known Allergies   Prior to Admission medications    Medication Sig Start Date End Date Taking? Authorizing Provider   leuprolide depot (Lupron Depot, 3 month,) 22.5 mg injection 22.5 mg by IntraMUSCular route once. Yes Provider, Historical   darolutamide (Nubeqa) 300 mg tablet Take 2 Tablets by mouth two (2) times daily (with meals). 1/12/22  Yes Jonathan Enriquez MD        ASSESSMENT and PLAN  Diagnoses and all orders for this visit:    1. Malignant neoplasm of prostate [facility])  Assessment & Plan:  PSA recurrence after prostatectomy, on Lupron. Castrate resistant. Check bone scan and CT abd/pel    Orders:  -     AMB POC URINALYSIS DIP STICK AUTO W/O MICRO  -     NM BONE SCAN [address] BODY;  Future  -     CT ABD PELV W WO CONT; Future  -     PSA, DIAGNOSTIC (PROSTATE SPECIFIC AG); Future    2. Increasing prostate specific antigen (PSA) level after treatment for malignant neoplasm of prostate  Assessment & Plan:   He has PSA recurrence, castrate resistant nm prostate cancer. His insurance did not cover Loraine Khan. We will try another antiandrogen. Orders:  -     CT ABD PELV W WO CONT; Future  -     PSA, DIAGNOSTIC (PROSTATE SPECIFIC AG); Future    3. Hot flashes  Assessment & Plan:   He is advised to try drinking soy milk. He is also bothered by Lupron injection site pain. Orders:  -     PSA, DIAGNOSTIC (PROSTATE SPECIFIC AG); Future    Other orders  -     darolutamide (Nubeqa) 300 mg tablet; Take 2 Tablets by mouth two (2) times daily (with meals).          Ngoc Martinez MD

## 2022-01-12 NOTE — PROGRESS NOTES
Chief Complaint   Patient presents with    [facility]     [facility] [place]     Injection    Erectile Dysfunction    Urinary Incontinence    Elevated PSA    Prostate Cancer     1. Have you been to the ER, urgent care clinic since your last visit? Hospitalized since your last visit? Yes Where: [facility]    2. Have you seen or consulted any other health care providers outside of the [address] [place] [address] since your last visit? Include any pap smears or colon screening.  No  Visit Vitals  Ht 5' 9\" (1.753 m)   Wt 170 lb (77.1 kg)   BMI 25.10 kg/m²

## 2022-01-14 NOTE — TELEPHONE ENCOUNTER
pts wife contacted the office back and is aware of the info below. She is going to call her  and see if he can come by to sign papers after work today.  And will call us back

## 2022-01-14 NOTE — TELEPHONE ENCOUNTER
Dr Etienne Gonzales wants to start pt on Myrla Pastel since we have not heard back from [employer]. . I have filled out the form. Its on my desk. But does require pt signature in 2 spots. I have attempted to contact pt but no answer, I lvm asking him to call back so I can provide him with this info. If pt comes in when I am not here. He can be given the top form that has the steps and instructions to contact the company for medication delivery. And we need the following forms to fax after he has signed.       I will attempt to call Monday if I dont hear anything

## 2022-01-19 ENCOUNTER — HOSPITAL ENCOUNTER (EMERGENCY)
Age: 52
Discharge: HOME | End: 2022-01-19
Attending: EMERGENCY MEDICINE
Payer: COMMERCIAL

## 2022-01-19 VITALS
HEIGHT: 69 IN | DIASTOLIC BLOOD PRESSURE: 100 MMHG | TEMPERATURE: 97.9 F | SYSTOLIC BLOOD PRESSURE: 158 MMHG | WEIGHT: 187 LBS | HEART RATE: 95 BPM | OXYGEN SATURATION: 99 % | RESPIRATION RATE: 16 BRPM | BODY MASS INDEX: 27.7 KG/M2

## 2022-01-19 DIAGNOSIS — J06.9 ACUTE UPPER RESPIRATORY INFECTION: Primary | ICD-10-CM

## 2022-01-19 PROCEDURE — 74011250637 HC RX REV CODE- 250/637: Performed by: EMERGENCY MEDICINE

## 2022-01-19 PROCEDURE — 99283 EMERGENCY DEPT VISIT LOW MDM: CPT

## 2022-01-19 RX ORDER — ACETAMINOPHEN 500 MG
1000 TABLET ORAL ONCE
Status: COMPLETED | OUTPATIENT
Start: 2022-01-20 | End: 2022-01-19

## 2022-01-19 RX ORDER — DIPHENHYDRAMINE HCL 25 MG
50 CAPSULE ORAL ONCE
Status: COMPLETED | OUTPATIENT
Start: 2022-01-20 | End: 2022-01-19

## 2022-01-19 RX ORDER — IBUPROFEN 800 MG/1
800 TABLET ORAL ONCE
Status: COMPLETED | OUTPATIENT
Start: 2022-01-20 | End: 2022-01-19

## 2022-01-19 RX ADMIN — DIPHENHYDRAMINE HYDROCHLORIDE 50 MG: 25 CAPSULE ORAL at 23:38

## 2022-01-19 RX ADMIN — IBUPROFEN 800 MG: 800 TABLET, FILM COATED ORAL at 23:38

## 2022-01-19 RX ADMIN — ACETAMINOPHEN 1000 MG: 500 TABLET ORAL at 23:38

## 2022-01-19 NOTE — Clinical Note
[facility] EMERGENCY DEPARTMENT  [address] [place] [address]  [phone]    Work/School Note    Date: 1/19/2022     To Whom It May concern:    Joe Ferrer was evaluated by the following provider(s):  Attending Provider: Nel Vargas MD.   [place] virus is suspected. Per the CDC guidelines we recommend home isolation until the following conditions are all met:    1. At least five days have passed since symptoms first appeared and/or had a close exposure,   2. After home isolation for five days, wearing a mask around others for the next five days,  3. At least 24 have passed since last fever without the use of fever-reducing medications and  4.  Symptoms (eg cough, shortness of breath) have improved      Sincerely,          Vicky Nicholas MD

## 2022-01-20 ENCOUNTER — PATIENT MESSAGE (OUTPATIENT)
Dept: UROLOGY | Age: 52
End: 2022-01-20

## 2022-01-20 NOTE — TELEPHONE ENCOUNTER
I responded to pt on the portal - see below     Marco A, your wife was provided with a paper when she came to sign your form, with the steps to receive your medication. You can reach out to the company and check the status. If they need something completed by us we still have yet to receive anything so you can ask them to fax it to us, [phone] is our fax number.       The number for the [facility] is [phone]
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
## 2022-01-20 NOTE — TELEPHONE ENCOUNTER
I have reached out to them myself also and they are looking for the fax that was sent over on the 14th and I will keep you updated

## 2022-01-20 NOTE — TELEPHONE ENCOUNTER
Pt wife left  A voicemail wanting to check on the status of the medication NUBEQA and when he would get it

## 2022-02-02 ENCOUNTER — HOSPITAL ENCOUNTER (OUTPATIENT)
Dept: NUCLEAR MEDICINE | Age: 52
Discharge: HOME | End: 2022-02-02
Attending: UROLOGY
Payer: COMMERCIAL

## 2022-02-02 ENCOUNTER — HOSPITAL ENCOUNTER (OUTPATIENT)
Dept: CT IMAGING | Age: 52
Discharge: HOME | End: 2022-02-02
Attending: UROLOGY
Payer: COMMERCIAL

## 2022-02-02 DIAGNOSIS — C61 MALIGNANT NEOPLASM OF PROSTATE (HCC): ICD-10-CM

## 2022-02-02 DIAGNOSIS — R97.21 INCREASING PROSTATE SPECIFIC ANTIGEN (PSA) LEVEL AFTER TREATMENT FOR MALIGNANT NEOPLASM OF PROSTATE: ICD-10-CM

## 2022-02-02 PROCEDURE — 74178 CT ABD&PLV WO CNTR FLWD CNTR: CPT

## 2022-02-02 PROCEDURE — 74011000636 HC RX REV CODE- 636: Performed by: UROLOGY

## 2022-02-02 PROCEDURE — 78306 BONE IMAGING WHOLE BODY: CPT

## 2022-02-02 RX ADMIN — IOPAMIDOL 100 ML: 755 INJECTION, SOLUTION INTRAVENOUS at 12:00

## 2022-02-03 NOTE — PROGRESS NOTES
Results reviewed with  Shanta Scanlon (patient at work) who will go over them with him this evening; he is on [facility]. They will call with questions; otherwise follow up as planned.

## 2022-03-18 PROBLEM — F10.10 ALCOHOL ABUSE, CONTINUOUS DRINKING BEHAVIOR: Status: ACTIVE | Noted: 2020-08-18

## 2022-03-18 PROBLEM — C61 MALIGNANT NEOPLASM OF PROSTATE (HCC): Status: ACTIVE | Noted: 2020-07-07

## 2022-03-18 PROBLEM — Z82.49 FAMILY HISTORY OF HYPERTENSION: Status: ACTIVE | Noted: 2020-08-18

## 2022-03-18 PROBLEM — F10.90 ALCOHOL INTAKE ABOVE RECOMMENDED SENSIBLE LIMITS: Status: ACTIVE | Noted: 2020-08-18

## 2022-03-19 PROBLEM — Z80.42 FAMILY HISTORY OF PROSTATE CANCER: Status: ACTIVE | Noted: 2020-07-07

## 2022-03-19 PROBLEM — R10.2 PELVIC PAIN: Status: ACTIVE | Noted: 2020-08-03

## 2022-03-19 PROBLEM — F17.200 NICOTINE DEPENDENCE: Status: ACTIVE | Noted: 2020-08-18

## 2022-03-19 PROBLEM — Z82.3 FAMILY HISTORY OF STROKE: Status: ACTIVE | Noted: 2020-08-18

## 2022-03-19 PROBLEM — K64.9 HEMORRHOIDS: Status: ACTIVE | Noted: 2020-07-07

## 2022-03-19 PROBLEM — N52.9 IMPOTENCE: Status: ACTIVE | Noted: 2021-01-17

## 2022-03-19 PROBLEM — R97.21 INCREASING PROSTATE SPECIFIC ANTIGEN (PSA) LEVEL AFTER TREATMENT FOR MALIGNANT NEOPLASM OF PROSTATE: Status: ACTIVE | Noted: 2020-07-07

## 2022-03-19 PROBLEM — E87.6 HYPOKALEMIA: Status: ACTIVE | Noted: 2020-08-18

## 2022-03-19 PROBLEM — R32 URINARY INCONTINENCE: Status: ACTIVE | Noted: 2020-08-03

## 2022-03-19 PROBLEM — R03.0 ELEVATED BLOOD PRESSURE READING WITHOUT DIAGNOSIS OF HYPERTENSION: Status: ACTIVE | Noted: 2020-08-18

## 2022-03-19 PROBLEM — Z83.3 FAMILY HISTORY OF TYPE 2 DIABETES MELLITUS: Status: ACTIVE | Noted: 2020-08-18

## 2022-03-19 PROBLEM — R23.2 HOT FLASHES: Status: ACTIVE | Noted: 2021-01-17

## 2022-04-09 LAB — PSA SERPL-MCNC: 1.4 NG/ML (ref 0–4)

## 2022-04-11 NOTE — PROGRESS NOTES
Labs / tests reviewed. Patient has an upcoming appointment. Plan to discuss findings at appointment.

## 2022-04-13 ENCOUNTER — OFFICE VISIT (OUTPATIENT)
Dept: UROLOGY | Age: 52
End: 2022-04-13
Payer: COMMERCIAL

## 2022-04-13 VITALS
HEART RATE: 119 BPM | HEIGHT: 70 IN | BODY MASS INDEX: 25.05 KG/M2 | DIASTOLIC BLOOD PRESSURE: 93 MMHG | SYSTOLIC BLOOD PRESSURE: 151 MMHG | WEIGHT: 175 LBS

## 2022-04-13 DIAGNOSIS — R32 URINARY INCONTINENCE, UNSPECIFIED TYPE: ICD-10-CM

## 2022-04-13 DIAGNOSIS — L29.9 ITCHING: ICD-10-CM

## 2022-04-13 DIAGNOSIS — N52.9 IMPOTENCE: ICD-10-CM

## 2022-04-13 DIAGNOSIS — C61 MALIGNANT NEOPLASM OF PROSTATE (HCC): Primary | ICD-10-CM

## 2022-04-13 DIAGNOSIS — I10 PRIMARY HYPERTENSION: ICD-10-CM

## 2022-04-13 DIAGNOSIS — R97.21 INCREASING PROSTATE SPECIFIC ANTIGEN (PSA) LEVEL AFTER TREATMENT FOR MALIGNANT NEOPLASM OF PROSTATE: ICD-10-CM

## 2022-04-13 PROCEDURE — 96402 CHEMO HORMON ANTINEOPL SQ/IM: CPT | Performed by: UROLOGY

## 2022-04-13 PROCEDURE — 99214 OFFICE O/P EST MOD 30 MIN: CPT | Performed by: UROLOGY

## 2022-04-13 RX ORDER — CETIRIZINE HCL 10 MG
10 TABLET ORAL DAILY
Qty: 90 TABLET | Refills: 3 | Status: SHIPPED | OUTPATIENT
Start: 2022-04-13 | End: 2022-07-14 | Stop reason: ALTCHOICE

## 2022-04-13 RX ORDER — DAROLUTAMIDE 300 MG/1
600 TABLET, FILM COATED ORAL 2 TIMES DAILY WITH MEALS
Qty: 120 TABLET | Refills: 11 | Status: SHIPPED | OUTPATIENT
Start: 2022-04-13 | End: 2022-07-14 | Stop reason: ALTCHOICE

## 2022-04-13 NOTE — PROGRESS NOTES
HISTORY OF PRESENT ILLNESS  Zumla Nicole is a 45 y.o. male. has a past medical history of Alcohol abuse, continuous drinking behavior (8/18/2020), Alcohol intake above recommended sensible limits (8/18/2020), Elevated blood pressure reading without diagnosis of hypertension (8/18/2020), Elevated prostate specific antigen (PSA) (7/7/2020), Family history of cardiovascular disease (8/18/2020), Family history of malignant neoplasm of prostate (7/7/2020), Family history of stroke (8/18/2020), Family history of type 2 diabetes mellitus (8/18/2020), Hypokalemia (8/18/2020), Malignant neoplasm of prostate ([facility] [address] (7/7/2020), Nicotine dependence (8/18/2020), Other hemorrhoids (7/7/2020), and Screening cholesterol level (8/18/2020). He has no past medical history of Nicotine vapor product user or Non-nicotine vapor product user. has a past surgical history that includes hx hip arthroscopy; ir bx lymph node superficial (06/22/2020); hx radical prostatectomy; biopsy prostate; hx hip replacement; hx amputation toe; hx urological (09/08/2020); hx orthopaedic (Right, 1985); and pr total hip arthroplasty (Left). Chief Complaint   Patient presents with    Follow-up    Prostate Cancer    Elevated PSA     HPI     He started [place]. His PSA has decreased from 3.4-1.4. He only took 30 days worth because he did not have a refill. He stopped about 1 month ago. He was asked about common side effects such as:  Feeling more tired than usual (16% vs 11% on hormone therapy alone) DENIED. He actually feels less tired than before surgery. Pain in arm, leg, hand, or foot (6% vs 3% on hormone therapy alone) DENIED  Rash (3% vs 1% on hormone therapy alone). He did have generalized itching. It was a little bit on his arms and legs.     13% of men had to stop taking NUBEQA and then start it again because of side effects  - The most common reasons were high blood pressure (hypertension; 0.6%), diarrhea (0.5%), and pneumonia (0.5%) DENIED  6% of men taking Day[place] had to reduce their dosage because of side effects  - The most common reasons included fatigue (0.7%), high blood pressure (hypertension; 0.3%), and nausea (0.3%) DENIED    His blood pressure today was 151/93, not much different from 158/100 January 19, 2022. He has a lot of stress at work. He has an audit. Chronic Conditions Addressed Today     1. Malignant neoplasm of prostate [facility]) - Primary     Overview      Biopsy 5/1/2020 with [place]'s 7 (3+4 and 4+3) disease in 11/12 cores with up to 90% involvement. Clinical stage T2.  CT scan and bones scan done 5/18/2020 with no evidence of metastatic disease. He is s/p prostatectomy and PLND on 6/22/20. Pathology reports [place]'s 4+5 disease with extensive bilateral involvement (80%) with extension into the seminal vesicle, and the extraprostatic soft tissue, with a positive posterior margin (bilaterally) as well as invasion into the bladder base margins. Clear lymph nodes. Adjuvant therapy with ADT recommended. Started Lupron on 9/8/2020. PSA 8/3/2020 was 0.9  PSA 9/4/2020: 1.3   Started on Lupron 9/8/2020. PSA 1/11/2021: undetectable. PSA 5/17/21: 0.1  PSA 1/5/22:  3.4     Insurance coverage for Zytiga/prednisone was denied. He was given RX for [employer]. Current Assessment & Plan      He is status post prostatectomy 6/22/2020, locally aggressive with extension into the seminal vesicles, extraprostatic soft tissue and bladder base. Postop PSA was 0.9. He was started on Lupron. His PSA went to undetectable but became detectable starting May 2021. It bernard to 3.4 on 1/5/2022. He was started on [employer]. PSA is decreased to 1.4 on 4/8/2022. Relevant Medications     leuprolide depot (LUPRON 3 MONTH) injection sykt 22.5 mg (Completed)     darolutamide (Nubeqa) 300 mg tablet     Other Relevant Orders     CBC WITH AUTOMATED DIFF     HEPATIC FUNCTION PANEL     PSA, DIAGNOSTIC (PROSTATE SPECIFIC AG)    2. Increasing prostate specific antigen (PSA) level after treatment for malignant neoplasm of prostate     Overview      Referred from Dr. Maria Ines Cool. PSA on 2/24/2020 is reported at 65.4 NG/ml. PSA on 4/21/2020 is 67.8 NG/ml, free 8.8%. He is s/p prostatectomy and PLND on 6/22/20. PSA 1.3 9/4/2020  Started Lupron on 9/8/2020. PSA 1/11/2021: undetectable. PSA 0.1 5/2021  PSA 1.1 9/23/21  PSA 3.4 on 1/5/22. Insurance denied Zytiga/prednisone. Given RX for Nubeqa. PSA of 1.4 on 4/9/2022          Current Assessment & Plan                 Relevant Medications     leuprolide depot (LUPRON 3 MONTH) injection sykt 22.5 mg (Completed)     Other Relevant Orders     CBC WITH AUTOMATED DIFF     HEPATIC FUNCTION PANEL     PSA, DIAGNOSTIC (PROSTATE SPECIFIC AG)    3. Impotence     Overview      1/17/2021: He is functioning sexually. He notes he is able to have penetrative intercourse most times. He thinks his tumescence ranges from 60-70%. He inquires about sildenafil or \"male enhancement drugs\" he has seen commercials for. We discussed the importance of avoiding such drugs as they may/may not contain testosterone which is contraindicated secondary to his prostate cancer. He expresses understanding. He was given a RX for sildenafil 100 mg. Instructed to start at 50 mg and titrate upward if necessary. 5/26/21: he has done well on sildenafil  mg. Current Assessment & Plan      he was functional on sildenafil post operatively         4. Primary hypertension     Current Assessment & Plan      His blood pressure continues to be on the high side. Does not seem to be affected by his prostate cancer medication. He is advised to see a primary care provider to help manage this. 5. Itching     Current Assessment & Plan      Seems to have a reaction to nubeqa. We will see if he can tolerate it with Zyrtec.            6. RESOLVED: Urinary incontinence     Overview      He is s/p prostatectomy on 6-. He has been educated on kegels. 1/17/21: great control, no pad usage. Rare, small volume leak. He does not prolong the urge. 9/2021: no persistent leakage. Current Assessment & Plan                      Past Medical History:    PMHx (including negatives):  has a past medical history of Alcohol abuse, continuous drinking behavior (8/18/2020), Alcohol intake above recommended sensible limits (8/18/2020), Elevated blood pressure reading without diagnosis of hypertension (8/18/2020), Elevated prostate specific antigen (PSA) (7/7/2020), Family history of cardiovascular disease (8/18/2020), Family history of malignant neoplasm of prostate (7/7/2020), Family history of stroke (8/18/2020), Family history of type 2 diabetes mellitus (8/18/2020), Hypokalemia (8/18/2020), Malignant neoplasm of prostate ([facility] [address] (7/7/2020), Nicotine dependence (8/18/2020), Other hemorrhoids (7/7/2020), and Screening cholesterol level (8/18/2020). He has no past medical history of Nicotine vapor product user or Non-nicotine vapor product user. PSurgHx:  has a past surgical history that includes hx hip arthroscopy; ir bx lymph node superficial (06/22/2020); hx radical prostatectomy; biopsy prostate; hx hip replacement; hx amputation toe; hx urological (09/08/2020); hx orthopaedic (Right, 1985); and pr total hip arthroplasty (Left). PSocHx:  reports that he has quit smoking. He has a 10.00 pack-year smoking history. He has never used smokeless tobacco. He reports current alcohol use of about 10.0 standard drinks of alcohol per week. He reports previous drug use. Drug: Marijuana. ROS  Physical Exam  No Known Allergies   Prior to Admission medications    Medication Sig Start Date End Date Taking? Authorizing Provider   cetirizine (ZYRTEC) 10 mg tablet Take 1 Tablet by mouth daily.  4/13/22  Yes MD zac Mcwilliams) 300 mg tablet Take 2 Tablets by mouth two (2) times daily (with meals). 4/13/22  Yes Clary Ch MD   leuprolide depot (Lupron Depot, 3 month,) 22.5 mg injection 22.5 mg by IntraMUSCular route once. Yes Provider, Historical        ASSESSMENT and PLAN  Diagnoses and all orders for this visit:    1. Malignant neoplasm of prostate [facility])  Assessment & Plan:  He is status post prostatectomy 6/22/2020, locally aggressive with extension into the seminal vesicles, extraprostatic soft tissue and bladder base. Postop PSA was 0.9. He was started on Lupron. His PSA went to undetectable but became detectable starting May 2021. It bernard to 3.4 on 1/5/2022. He was started on [place]. PSA is decreased to 1.4 on 4/8/2022. Orders:  -     leuprolide depot (LUPRON 3 MONTH) injection sykt 22.5 mg  -     CBC WITH AUTOMATED DIFF; Future  -     HEPATIC FUNCTION PANEL; Future  -     PSA, DIAGNOSTIC (PROSTATE SPECIFIC AG); Future    2. Increasing prostate specific antigen (PSA) level after treatment for malignant neoplasm of prostate  Assessment & Plan:       Orders:  -     leuprolide depot (LUPRON 3 MONTH) injection sykt 22.5 mg  -     CBC WITH AUTOMATED DIFF; Future  -     HEPATIC FUNCTION PANEL; Future  -     PSA, DIAGNOSTIC (PROSTATE SPECIFIC AG); Future    3. Impotence  Assessment & Plan:  he was functional on sildenafil post operatively      4. Primary hypertension  Assessment & Plan:  His blood pressure continues to be on the high side. Does not seem to be affected by his prostate cancer medication. He is advised to see a primary care provider to help manage this. 5. Urinary incontinence, unspecified type  Comments:  Resolved  Assessment & Plan:         6. Itching  Assessment & Plan:  Seems to have a reaction to nubeqa. We will see if he can tolerate it with Zyrtec. Other orders  -     cetirizine (ZYRTEC) 10 mg tablet; Take 1 Tablet by mouth daily. -     darolutamide (Nubeqa) 300 mg tablet; Take 2 Tablets by mouth two (2) times daily (with meals).          Clem Urbina Lacho Watson MD

## 2022-04-13 NOTE — ASSESSMENT & PLAN NOTE
He is status post prostatectomy 6/22/2020, locally aggressive with extension into the seminal vesicles, extraprostatic soft tissue and bladder base. Postop PSA was 0.9. He was started on Lupron. His PSA went to undetectable but became detectable starting May 2021. It bernard to 3.4 on 1/5/2022. He was started on nova scotia. PSA is decreased to 1.4 on 4/8/2022.

## 2022-04-13 NOTE — PATIENT INSTRUCTIONS
Disha Steward can harm unborn babies and cause loss of pregnancy.  Men with female partners who may become pregnant should use effective birth control (contraception)    The most common side effects of hormone therapy + NUBEQA vs hormone therapy alone are    Feeling more tired than usual (16% vs 11% on hormone therapy alone)    Pain in arm, leg, hand, or foot (6% vs 3% on hormone therapy alone)    Rash (3% vs 1% on hormone therapy alone)    Decreased white blood cells (neutropenia; 20% vs 9% on hormone therapy alone)    Changes in liver function tests (16% vs 7% on hormone therapy alone)    Serious side effects that happened in 1% or more of men who added NUBEQA to their hormone therapy included: not being able to urinate completely, pneumonia, and blood in the urine

## 2022-04-13 NOTE — ASSESSMENT & PLAN NOTE
His blood pressure continues to be on the high side. Does not seem to be affected by his prostate cancer medication. He is advised to see a primary care provider to help manage this.

## 2022-04-13 NOTE — PROGRESS NOTES
Chief Complaint   Patient presents with    Follow-up    Prostate Cancer    Elevated PSA       PHQ-9 score is   0 Negative    Vitals:    04/13/22 0815   BP: (!) 151/93   Pulse: (!) 119   Weight: 175 lb (79.4 kg)   Height: 5' 10\" (1.778 m)   PainSc:   0 - No pain      1. \"Have you been to the ER, urgent care clinic since your last visit? Hospitalized since your last visit? \" No    2. \"Have you seen or consulted any other health care providers outside of the [address] [place] [address] since your last visit? \" No     3. For patients aged 39-70: Has the patient had a colonoscopy / FIT/ Cologuard? No      If the patient is female:    4. For patients aged 41-77: Has the patient had a mammogram within the past 2 years? NA - based on age or sex      11. For patients aged 21-65: Has the patient had a pap smear?  NA - based on age or sex

## 2022-04-14 ENCOUNTER — PATIENT MESSAGE (OUTPATIENT)
Dept: UROLOGY | Age: 52
End: 2022-04-14

## 2022-04-14 NOTE — TELEPHONE ENCOUNTER
From: Conor Gonzalez  To: Amisha Benoit MD  Sent: 4/14/2022 8:02 AM EDT  Subject: Kevin Waldron    Good day,     This medication is not covered on the insurance. Are there any other alternatives? Estimated price was $16.000.00 without insurance coverage. Please let me know, either respond here or call my wife - [phone].      Thank you

## 2022-04-22 NOTE — TELEPHONE ENCOUNTER
I created an account with [employer] and completed this in January and again in March. The electronic hub stated an email was generated and sent to [facility] for approval/authorization for me as a hub user. When I spoke with [facility], learned that the email asked [place] to create an acct. , he didn't create acct. I have phone calls into [place] [address] patient support services and our [employer] rep. I will followup on this and keep updating.

## 2022-07-05 ENCOUNTER — HOSPITAL ENCOUNTER (EMERGENCY)
Age: 52
Discharge: HOME | End: 2022-07-05
Attending: EMERGENCY MEDICINE
Payer: COMMERCIAL

## 2022-07-05 ENCOUNTER — APPOINTMENT (OUTPATIENT)
Dept: CT IMAGING | Age: 52
End: 2022-07-05
Attending: EMERGENCY MEDICINE
Payer: COMMERCIAL

## 2022-07-05 VITALS
OXYGEN SATURATION: 99 % | SYSTOLIC BLOOD PRESSURE: 111 MMHG | TEMPERATURE: 98.5 F | BODY MASS INDEX: 24.34 KG/M2 | DIASTOLIC BLOOD PRESSURE: 73 MMHG | HEIGHT: 70 IN | HEART RATE: 88 BPM | WEIGHT: 170 LBS | RESPIRATION RATE: 20 BRPM

## 2022-07-05 DIAGNOSIS — F10.920 ALCOHOL INTOXICATION, UNCOMPLICATED (HCC): Primary | ICD-10-CM

## 2022-07-05 LAB
ALBUMIN SERPL-MCNC: 4.5 G/DL (ref 3.5–5.2)
ALBUMIN/GLOB SERPL: 1.7 {RATIO} (ref 1.1–2.2)
ALP SERPL-CCNC: 110 U/L (ref 40–129)
ALT SERPL-CCNC: 63 U/L (ref 10–50)
ANION GAP SERPL CALC-SCNC: 12 MMOL/L (ref 5–15)
APPEARANCE UR: CLEAR
AST SERPL-CCNC: 68 U/L (ref 10–50)
BACTERIA URNS QL MICRO: NEGATIVE /HPF
BASOPHILS # BLD: 0.1 K/UL (ref 0–0.1)
BASOPHILS NFR BLD: 1 % (ref 0–1)
BILIRUB SERPL-MCNC: 0.6 MG/DL (ref 0.2–1)
BILIRUB UR QL: NEGATIVE
BUN SERPL-MCNC: 7 MG/DL (ref 6–20)
BUN/CREAT SERPL: 11 (ref 12–20)
CALCIUM SERPL-MCNC: 9.4 MG/DL (ref 8.6–10)
CHLORIDE SERPL-SCNC: 105 MMOL/L (ref 98–107)
CK SERPL-CCNC: 136 U/L (ref 20–200)
CO2 SERPL-SCNC: 24 MMOL/L (ref 22–29)
COLOR UR: NORMAL
COMMENT, HOLDF: NORMAL
CREAT SERPL-MCNC: 0.64 MG/DL (ref 0.7–1.2)
DIFFERENTIAL METHOD BLD: ABNORMAL
EOSINOPHIL # BLD: 0.1 K/UL (ref 0–0.4)
EOSINOPHIL NFR BLD: 2 % (ref 0–7)
EPITH CASTS URNS QL MICRO: NORMAL /LPF
ERYTHROCYTE [DISTWIDTH] IN BLOOD BY AUTOMATED COUNT: 13.2 % (ref 11.5–14.5)
ETHANOL SERPL-MCNC: 264 MG/DL (ref 0–10)
GLOBULIN SER CALC-MCNC: 2.6 G/DL (ref 2–4)
GLUCOSE SERPL-MCNC: 110 MG/DL (ref 65–100)
GLUCOSE UR STRIP.AUTO-MCNC: NEGATIVE MG/DL
HCT VFR BLD AUTO: 34.4 % (ref 36.6–50.3)
HGB BLD-MCNC: 11.8 G/DL (ref 12.1–17)
HGB UR QL STRIP: NEGATIVE
IMM GRANULOCYTES # BLD AUTO: 0.1 K/UL (ref 0–0.04)
IMM GRANULOCYTES NFR BLD AUTO: 1 % (ref 0–0.5)
KETONES UR QL STRIP.AUTO: NEGATIVE MG/DL
LEUKOCYTE ESTERASE UR QL STRIP.AUTO: NEGATIVE
LYMPHOCYTES # BLD: 2.4 K/UL (ref 0.8–3.5)
LYMPHOCYTES NFR BLD: 33 % (ref 12–49)
MAGNESIUM SERPL-MCNC: 1.9 MG/DL (ref 1.6–2.6)
MCH RBC QN AUTO: 31.9 PG (ref 26–34)
MCHC RBC AUTO-ENTMCNC: 34.3 G/DL (ref 30–36.5)
MCV RBC AUTO: 93 FL (ref 80–99)
MONOCYTES # BLD: 0.5 K/UL (ref 0–1)
MONOCYTES NFR BLD: 7 % (ref 5–13)
NEUTS SEG # BLD: 4.1 K/UL (ref 1.8–8)
NEUTS SEG NFR BLD: 57 % (ref 32–75)
NITRITE UR QL STRIP.AUTO: NEGATIVE
NRBC # BLD: 0 K/UL (ref 0–0.01)
NRBC BLD-RTO: 0 PER 100 WBC
PH UR STRIP: 6 [PH] (ref 5–8)
PLATELET # BLD AUTO: 394 K/UL (ref 150–400)
PMV BLD AUTO: 9.6 FL (ref 8.9–12.9)
POTASSIUM SERPL-SCNC: 3.9 MMOL/L (ref 3.5–5.1)
PROT SERPL-MCNC: 7.1 G/DL (ref 6.4–8.3)
PROT UR STRIP-MCNC: NEGATIVE MG/DL
RBC # BLD AUTO: 3.7 M/UL (ref 4.1–5.7)
RBC #/AREA URNS HPF: NORMAL /HPF (ref 0–5)
SAMPLES BEING HELD,HOLD: NORMAL
SODIUM SERPL-SCNC: 141 MMOL/L (ref 136–145)
SP GR UR REFRACTOMETRY: <1.005 (ref 1–1.03)
UR CULT HOLD, URHOLD: NORMAL
UROBILINOGEN UR QL STRIP.AUTO: 0.2 EU/DL (ref 0.2–1)
WBC # BLD AUTO: 7.2 K/UL (ref 4.1–11.1)
WBC URNS QL MICRO: NORMAL /HPF (ref 0–4)

## 2022-07-05 PROCEDURE — 85025 COMPLETE CBC W/AUTO DIFF WBC: CPT

## 2022-07-05 PROCEDURE — 81001 URINALYSIS AUTO W/SCOPE: CPT

## 2022-07-05 PROCEDURE — 80053 COMPREHEN METABOLIC PANEL: CPT

## 2022-07-05 PROCEDURE — 82550 ASSAY OF CK (CPK): CPT

## 2022-07-05 PROCEDURE — 74011250636 HC RX REV CODE- 250/636: Performed by: EMERGENCY MEDICINE

## 2022-07-05 PROCEDURE — 82077 ASSAY SPEC XCP UR&BREATH IA: CPT

## 2022-07-05 PROCEDURE — 70450 CT HEAD/BRAIN W/O DYE: CPT

## 2022-07-05 PROCEDURE — 83735 ASSAY OF MAGNESIUM: CPT

## 2022-07-05 PROCEDURE — 36415 COLL VENOUS BLD VENIPUNCTURE: CPT

## 2022-07-05 PROCEDURE — 96360 HYDRATION IV INFUSION INIT: CPT

## 2022-07-05 PROCEDURE — 93005 ELECTROCARDIOGRAM TRACING: CPT

## 2022-07-05 PROCEDURE — 99284 EMERGENCY DEPT VISIT MOD MDM: CPT

## 2022-07-05 RX ADMIN — SODIUM CHLORIDE 1000 ML: 9 INJECTION, SOLUTION INTRAVENOUS at 18:18
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
## 2022-07-05 NOTE — ED TRIAGE NOTES
Pt ambulatory into ER with cc of dizziness since yesterday. Pt states, \"The room isn't spinning. \" Pt denies TRENT. Pt's spouse reports hx of similar dizziness episodes. Pt has hx of prostate CA and receives Lucatron injections.

## 2022-07-05 NOTE — ED NOTES
Pt and spouse given discharge instructions, patient education on etoh use by Gloria Batista MD, 0 prescriptions, and follow up information. Pt verbalizes understanding. All questions answered. Pt discharged to home in private vehicle, ambulatory. Pt A/Ox4, RA, pain controlled.

## 2022-07-06 LAB
ATRIAL RATE: 97 BPM
CALCULATED P AXIS, ECG09: 39 DEGREES
CALCULATED R AXIS, ECG10: 19 DEGREES
CALCULATED T AXIS, ECG11: 24 DEGREES
DIAGNOSIS, 93000: NORMAL
P-R INTERVAL, ECG05: 134 MS
Q-T INTERVAL, ECG07: 376 MS
QRS DURATION, ECG06: 82 MS
QTC CALCULATION (BEZET), ECG08: 477 MS
VENTRICULAR RATE, ECG03: 97 BPM

## 2022-07-07 NOTE — TELEPHONE ENCOUNTER
Mr. Bowened Jimenez confirmed with me today that he has not received any medication or additional contact from [facility]. Can we please re-visit this.

## 2022-07-07 NOTE — PROGRESS NOTES
HISTORY OF PRESENT ILLNESS  Cori López is a 45 y.o. male. Chief Complaint   Patient presents with    Follow-up    Injection    Prostate Cancer    Elevated PSA    Urinary Incontinence    Erectile Dysfunction     Past Medical History:  PMHx (including negatives):  has a past medical history of Alcohol abuse, continuous drinking behavior (8/18/2020), Alcohol intake above recommended sensible limits (8/18/2020), Elevated blood pressure reading without diagnosis of hypertension (8/18/2020), Elevated prostate specific antigen (PSA) (7/7/2020), Family history of cardiovascular disease (8/18/2020), Family history of malignant neoplasm of prostate (7/7/2020), Family history of stroke (8/18/2020), Family history of type 2 diabetes mellitus (8/18/2020), Hypokalemia (8/18/2020), Malignant neoplasm of prostate ([facility] [address] (7/7/2020), Nicotine dependence (8/18/2020), Other hemorrhoids (7/7/2020), and Screening cholesterol level (8/18/2020). He has no past medical history of Nicotine vapor product user or Non-nicotine vapor product user. PSurgHx:  has a past surgical history that includes hx hip arthroscopy; ir bx lymph node superficial (06/22/2020); hx radical prostatectomy; biopsy prostate; hx hip replacement; hx amputation toe; hx urological (09/08/2020); hx orthopaedic (Right, 1985); and pr total hip arthroplasty (Left). PSocHx:  reports that he has quit smoking. He has a 10.00 pack-year smoking history. He has never used smokeless tobacco. He reports current alcohol use of about 10.0 standard drinks of alcohol per week. He reports previous drug use. Drug: Marijuana. He is s/p prostatectomy and PLND on 6/22/20. Pathology reports Jaz's 4+5 disease with extensive bilateral involvement (80%) with extension into the seminal vesicle, and the extraprostatic soft tissue, with a positive posterior margin (bilaterally) as well as invasion into the bladder base margins. Clear lymph nodes.   Adjuvant therapy with ADT recommended. Started Lupron on 9/8/2020. PSA 8/3/2020 was 0.9  PSA 9/4/2020: 1.3   Started on Lupron 9/8/2020. PSA 1/11/2021: undetectable. PSA 5/17/21: 0.1  PSA 1/5/22:  3.4   PSA 4/8/22 was 1.4  PSA 7/7/22 was 6.2    Insurance coverage for Zytiga/prednisone was denied. He was given RX for [place]. He has been re-approved for [place] and is waiting on delivery. He has been instructed to let us know if he does not receive the medication within 2 weeks. 4/13/22: He started [place]. His PSA has decreased from 3.4-1.4 (4/8/22). He only took 30 days worth because he did not have a refill. He did not note any side effects since starting the medication. Chronic Conditions Addressed Today     1. Malignant neoplasm of prostate [facility]) - Primary     Overview      Biopsy 5/1/2020 with Jaz's 7 (3+4 and 4+3) disease in 11/12 cores with up to 90% involvement. Clinical stage T2.  CT scan and bones scan done 5/18/2020 with no evidence of metastatic disease. He is s/p prostatectomy and PLND on 6/22/20. Pathology reports [place]'s 4+5 disease with extensive bilateral involvement (80%) with extension into the seminal vesicle, and the extraprostatic soft tissue, with a positive posterior margin (bilaterally) as well as invasion into the bladder base margins. Clear lymph nodes. Adjuvant therapy with ADT recommended. Started Lupron on 9/8/2020. PSA 8/3/2020 was 0.9  PSA 9/4/2020: 1.3   Started on Lupron 9/8/2020. PSA 1/11/2021: undetectable. PSA 5/17/21: 0.1  PSA 1/5/22:  3.4     Insurance coverage for Zytiga/prednisone was denied. He was given RX for [place]. 4/13/22: He started [place]. His PSA has decreased from 3.4-1.4 (4/8/22). He only took 30 days worth because he did not have a refill. He stopped about 1 month ago. 2. Increasing prostate specific antigen (PSA) level after treatment for malignant neoplasm of prostate     Overview      Referred from Dr. Alanna Roque.   PSA on 2/24/2020 is reported at 65.4 NG/ml. PSA on 4/21/2020 is 67.8 NG/ml, free 8.8%. He is s/p prostatectomy and PLND on 6/22/20. PSA 1.3 9/4/2020  Started Lupron on 9/8/2020. PSA 1/11/2021: undetectable. PSA 0.1 5/2021  PSA 1.1 9/23/21  PSA 3.4 on 1/5/22. Insurance denied Zytiga/prednisone. Given RX for Nubeqa. PSA of 1.4 on 4/9/2022         3. Impotence     Overview      1/17/2021: He is functioning sexually. He notes he is able to have penetrative intercourse most times. He thinks his tumescence ranges from 60-70%. He inquires about sildenafil or \"male enhancement drugs\" he has seen commercials for. We discussed the importance of avoiding such drugs as they may/may not contain testosterone which is contraindicated secondary to his prostate cancer. He expresses understanding. He was given a RX for sildenafil 100 mg. Instructed to start at 50 mg and titrate upward if necessary. 5/26/21: he has done well on sildenafil  mg.    4/13/22: Continues to do well on sildenafil. ROS  Patient denies the symptoms of COVID-19 per routine screening guidelines. Physical Exam    ASSESSMENT and PLAN  Diagnoses and all orders for this visit:    1. Malignant neoplasm of prostate [facility])  Assessment & Plan:  H/o prostatectomy 2020 with invasive disease. PSA was detectable postop. He has castrate resistant, nonmetastatic prostate cancer. He was on Nubeqa for 30 days but had trouble getting refills. He says he was called last week and was approved. He still has not received his medication. PSA is increasing off treatment. Continue Lupron  Resume Nubeqa. He apparently has been approved for this and is awaiting shipment of his medication. Orders:  -     leuprolide depot (LUPRON 3 MONTH) injection sykt 22.5 mg  -     AMB POC URINALYSIS DIP STICK AUTO W/O MICRO  -     PSA, DIAGNOSTIC (PROSTATE SPECIFIC AG); Future    2.  Increasing prostate specific antigen (PSA) level after treatment for malignant neoplasm of prostate  Assessment & Plan:   PSA 6.3. He is not on Nubeqa. Cancer is progressing. He should resume when he receives it. Orders:  -     leuprolide depot (LUPRON 3 MONTH) injection sykt 22.5 mg  -     AMB POC URINALYSIS DIP STICK AUTO W/O MICRO  -     PSA, DIAGNOSTIC (PROSTATE SPECIFIC AG); Future    3. Impotence  Assessment & Plan:   Stable. Not active currently. Follow-up and Dispositions    · Return in about 3 months (around 10/14/2022) for Lupron, PSA prior. Cori López may have a reminder for a \"due or due soon\" health maintenance. The patient has been encouraged to contact their primary care provider for follow-up on this health maintenance or other necessary and/or routine health screening.      Perez Bassett MD

## 2022-07-08 ENCOUNTER — PATIENT MESSAGE (OUTPATIENT)
Dept: UROLOGY | Age: 52
End: 2022-07-08

## 2022-07-08 ENCOUNTER — TELEPHONE (OUTPATIENT)
Dept: UROLOGY | Age: 52
End: 2022-07-08

## 2022-07-08 LAB
ALBUMIN SERPL-MCNC: 4.7 G/DL (ref 3.8–4.9)
ALP SERPL-CCNC: 116 IU/L (ref 44–121)
ALT SERPL-CCNC: 68 IU/L (ref 0–44)
AST SERPL-CCNC: 70 IU/L (ref 0–40)
BASOPHILS # BLD AUTO: 0.1 X10E3/UL (ref 0–0.2)
BASOPHILS NFR BLD AUTO: 1 %
BILIRUB DIRECT SERPL-MCNC: 0.12 MG/DL (ref 0–0.4)
BILIRUB SERPL-MCNC: 0.4 MG/DL (ref 0–1.2)
EOSINOPHIL # BLD AUTO: 0.1 X10E3/UL (ref 0–0.4)
EOSINOPHIL NFR BLD AUTO: 1 %
ERYTHROCYTE [DISTWIDTH] IN BLOOD BY AUTOMATED COUNT: 13.9 % (ref 11.6–15.4)
HCT VFR BLD AUTO: 37.6 % (ref 37.5–51)
HGB BLD-MCNC: 12.5 G/DL (ref 13–17.7)
IMM GRANULOCYTES # BLD AUTO: 0 X10E3/UL (ref 0–0.1)
IMM GRANULOCYTES NFR BLD AUTO: 0 %
LYMPHOCYTES # BLD AUTO: 1.4 X10E3/UL (ref 0.7–3.1)
LYMPHOCYTES NFR BLD AUTO: 22 %
MCH RBC QN AUTO: 32.3 PG (ref 26.6–33)
MCHC RBC AUTO-ENTMCNC: 33.2 G/DL (ref 31.5–35.7)
MCV RBC AUTO: 97 FL (ref 79–97)
MONOCYTES # BLD AUTO: 0.5 X10E3/UL (ref 0.1–0.9)
MONOCYTES NFR BLD AUTO: 8 %
NEUTROPHILS # BLD AUTO: 4.4 X10E3/UL (ref 1.4–7)
NEUTROPHILS NFR BLD AUTO: 68 %
PLATELET # BLD AUTO: 381 X10E3/UL (ref 150–450)
PROT SERPL-MCNC: 7.2 G/DL (ref 6–8.5)
PSA SERPL-MCNC: 6.2 NG/ML (ref 0–4)
RBC # BLD AUTO: 3.87 X10E6/UL (ref 4.14–5.8)
WBC # BLD AUTO: 6.5 X10E3/UL (ref 3.4–10.8)

## 2022-07-08 NOTE — TELEPHONE ENCOUNTER
Per Martell Katz was approved. They will contact patient to schedule a delivery. I let patient know via [employer].

## 2022-07-08 NOTE — TELEPHONE ENCOUNTER
April from [employer] is APPROVED, a fax is being sent over to us. [facility] notifying Patient he be contacted to schedule delivery.

## 2022-07-08 NOTE — TELEPHONE ENCOUNTER
Regarding: Michael Meade  ----- Message from Sean Betancourt NP sent at 7/7/2022  3:26 PM EDT -----       ----- Message sent from Dalila Stephenson to Kerry Sunshine at 5/4/2022  1:05 PM -----   I faxed this on 4/22/22 and I will fax again. Thanks      ----- Message -----       From:Abhi Herzog       Sent:5/2/2022 12:06 PM EDT         [address], [place]    Subject:Nubeqa    Good afternoon,    Devi sent the documents and the last page requires the Healthcare Professional to fill out and fax. Can you please review? Connor Heller  [phone]      ----- Message -----       From:Abhi Clements       Sent:4/20/2022  9:45 AM EDT         [address], [place]    Subject:Nubeqa    Any update? Thank you      ----- Message -----       From:Abhi Herzog       Sent:4/14/2022 10:44 AM EDT         [address], [place]    BBCRCNM:[id number]    I spoke with [facility] Patient at [phone]. They state that Kimmy Hale needs to be enrolled in the program and they would fax over the patient enrollment form to [phone] and it will need to be signed and faxed back. ----- Message -----       From:Abhi Herzog       Sent:4/14/2022  8:02 AM EDT         [address], [place]    Subject:Nubeqa    Good day,     This medication is not covered on the insurance. Are there any other alternatives? Estimated price was $16.000.00 without insurance coverage. Please let me know, either respond here or call my wife - [phone].      Thank you

## 2022-07-14 ENCOUNTER — OFFICE VISIT (OUTPATIENT)
Dept: UROLOGY | Age: 52
End: 2022-07-14
Payer: COMMERCIAL

## 2022-07-14 VITALS
BODY MASS INDEX: 24.88 KG/M2 | WEIGHT: 168 LBS | SYSTOLIC BLOOD PRESSURE: 151 MMHG | HEIGHT: 69 IN | HEART RATE: 100 BPM | DIASTOLIC BLOOD PRESSURE: 93 MMHG

## 2022-07-14 DIAGNOSIS — C61 MALIGNANT NEOPLASM OF PROSTATE (HCC): Primary | ICD-10-CM

## 2022-07-14 DIAGNOSIS — R97.21 INCREASING PROSTATE SPECIFIC ANTIGEN (PSA) LEVEL AFTER TREATMENT FOR MALIGNANT NEOPLASM OF PROSTATE: ICD-10-CM

## 2022-07-14 DIAGNOSIS — N52.9 IMPOTENCE: ICD-10-CM

## 2022-07-14 LAB
BILIRUB UR QL: NEGATIVE
GLUCOSE UR-MCNC: NEGATIVE MG/DL
KETONES P FAST UR STRIP-MCNC: NEGATIVE MG/DL
PH UR STRIP: 6 [PH] (ref 4.6–8)
PROT UR QL STRIP: NEGATIVE
SP GR UR STRIP: 1.02 (ref 1–1.03)
UA UROBILINOGEN AMB POC: NORMAL (ref 0.2–1)
URINALYSIS CLARITY POC: CLEAR
URINALYSIS COLOR POC: YELLOW
URINE BLOOD POC: NEGATIVE
URINE LEUKOCYTES POC: NEGATIVE
URINE NITRITES POC: NEGATIVE

## 2022-07-14 PROCEDURE — 99214 OFFICE O/P EST MOD 30 MIN: CPT | Performed by: UROLOGY

## 2022-07-14 PROCEDURE — 81003 URINALYSIS AUTO W/O SCOPE: CPT | Performed by: UROLOGY

## 2022-07-14 PROCEDURE — 96402 CHEMO HORMON ANTINEOPL SQ/IM: CPT | Performed by: UROLOGY

## 2022-07-14 NOTE — TELEPHONE ENCOUNTER
Patient has not yet been contacted by [facility] for delivery. Can we reach back out to Gurwinder Guzman(?) to assess status of delivery?

## 2022-07-14 NOTE — PROGRESS NOTES
Chief Complaint   Patient presents with    Follow-up    Injection    Prostate Cancer    Elevated PSA    Urinary Incontinence    Erectile Dysfunction     1. Have you been to the ER, urgent care clinic since your last visit? Hospitalized since your last visit? Yes Where: [facility]     2. Have you seen or consulted any other health care providers outside of the [address] [place] [address] since your last visit? Include any pap smears or colon screening.  No  Visit Vitals  BP (!) 151/93 (BP 1 Location: Right upper arm, BP Patient Position: Sitting, BP Cuff Size: Adult)   Pulse 100   Ht 5' 9\" (1.753 m)   Wt 168 lb (76.2 kg)   BMI 24.81 kg/m²

## 2022-07-14 NOTE — ASSESSMENT & PLAN NOTE
H/o prostatectomy 2020 with invasive disease. PSA was detectable postop. He has castrate resistant, nonmetastatic prostate cancer. He was on Nubeqa for 30 days but had trouble getting refills. He says he was called last week and was approved. He still has not received his medication. PSA is increasing off treatment. Continue Lupron  Resume Nubeqa. He apparently has been approved for this and is awaiting shipment of his medication.

## 2022-08-01 NOTE — TELEPHONE ENCOUNTER
[id number] Naomi Townsend, so this was completed 1/21/22, sent forms to Gurwinder Guzman, also completed Prior Auth thru cover my meds and it was approved 3/4/22  Patient still hadnt received meds. I contacted Ej Sykes and asked why I've been working on this since January, Mr Dea Orozco found out that because [employer] had never signed authorization thru email that the specialty pharm closed case. I asked why we were never contacted and he said he didn't know but was going to find out and let me know. From now on all forms need to be completed by hand and signed by Nena Torres and faxed dont even try using the portals becuz they require Providers to create an account to sign electronically, aint nobody got time for that. All forms have been completed, signed and faxed.  Today Start atorvastatin 20 mg daily - update chart  Start OTC Vit D 2000 units daily

## 2022-08-10 NOTE — ED NOTES
Change of shift. Care of patient taken over from Dr Katerine Green; H&P reviewed, bedside handoff complete. Awaiting completion of workup and reassessment. Alcohol level dramatically elevated. Patient denies drinking. I discussed likely alcohol abuse with family and need to pursue treatment when he is ready. This is the likely cause of symptoms. Recommended cutting back on consumption. Plan to follow up with PCP as needed and return precautions discussed for worsening or new concerning symptoms.

## 2022-08-23 ENCOUNTER — TELEPHONE (OUTPATIENT)
Dept: UROLOGY | Age: 52
End: 2022-08-23

## 2022-10-14 DIAGNOSIS — R97.21 INCREASING PROSTATE SPECIFIC ANTIGEN (PSA) LEVEL AFTER TREATMENT FOR MALIGNANT NEOPLASM OF PROSTATE: ICD-10-CM

## 2022-10-14 DIAGNOSIS — C61 MALIGNANT NEOPLASM OF PROSTATE (HCC): ICD-10-CM

## 2022-10-30 LAB — PSA SERPL-MCNC: 0.2 NG/ML (ref 0–4)

## 2022-11-02 ENCOUNTER — OFFICE VISIT (OUTPATIENT)
Dept: UROLOGY | Age: 52
End: 2022-11-02
Payer: COMMERCIAL

## 2022-11-02 VITALS — WEIGHT: 173 LBS | HEIGHT: 69 IN | BODY MASS INDEX: 25.62 KG/M2

## 2022-11-02 DIAGNOSIS — N52.9 IMPOTENCE: ICD-10-CM

## 2022-11-02 DIAGNOSIS — R97.21 INCREASING PROSTATE SPECIFIC ANTIGEN (PSA) LEVEL AFTER TREATMENT FOR MALIGNANT NEOPLASM OF PROSTATE: Primary | ICD-10-CM

## 2022-11-02 DIAGNOSIS — C61 MALIGNANT NEOPLASM OF PROSTATE (HCC): ICD-10-CM

## 2022-11-02 PROCEDURE — 96402 CHEMO HORMON ANTINEOPL SQ/IM: CPT | Performed by: UROLOGY

## 2022-11-02 RX ORDER — DAROLUTAMIDE 300 MG/1
300 TABLET, FILM COATED ORAL 2 TIMES DAILY WITH MEALS
COMMUNITY

## 2022-11-02 NOTE — ASSESSMENT & PLAN NOTE
High risk prostate cancer s/p prostatectomy. Metastatic castrate resistant. PSA response to darolutamide. He has some itching with 2 pills at a time. He tried 1 and it was less. We will reduce the dose to 300mg bid. Observe PSA trends.

## 2022-11-02 NOTE — PROGRESS NOTES
Chief Complaint   Patient presents with    Follow-up    Injection    Prostate Cancer    Erectile Dysfunction    Elevated PSA     1. Have you been to the ER, urgent care clinic since your last visit? Hospitalized since your last visit? No    2. Have you seen or consulted any other health care providers outside of the [address] [place] [address] since your last visit? Include any pap smears or colon screening.  No  Visit Vitals  Ht 5' 9\" (1.753 m)   Wt 173 lb (78.5 kg)   BMI 25.55 kg/m²

## 2022-11-02 NOTE — PROGRESS NOTES
HISTORY OF PRESENT ILLNESS  Adra Sicard is a 47 y.o. male. has a past medical history of Alcohol abuse, continuous drinking behavior (8/18/2020), Alcohol intake above recommended sensible limits (8/18/2020), Elevated blood pressure reading without diagnosis of hypertension (8/18/2020), Elevated prostate specific antigen (PSA) (7/7/2020), Family history of cardiovascular disease (8/18/2020), Family history of malignant neoplasm of prostate (7/7/2020), Family history of stroke (8/18/2020), Family history of type 2 diabetes mellitus (8/18/2020), Hypokalemia (8/18/2020), Malignant neoplasm of prostate ([facility] [address] (7/7/2020), Nicotine dependence (8/18/2020), Other hemorrhoids (7/7/2020), and Screening cholesterol level (8/18/2020). He has no past medical history of Nicotine vapor product user or Non-nicotine vapor product user. has a past surgical history that includes hx hip arthroscopy; ir bx lymph node superficial (06/22/2020); hx radical prostatectomy; biopsy prostate; hx hip replacement; hx amputation toe; hx urological (09/08/2020); hx orthopaedic (Right, 1985); and pr total hip arthroplasty (Left). Chief Complaint   Patient presents with    Follow-up    Injection    Prostate Cancer    Erectile Dysfunction    Elevated PSA     HPI  Chronic Conditions Addressed Today       1. Malignant neoplasm of prostate ([facility] [address]     Overview      Biopsy 5/1/2020 with Jaz's 7 (3+4 and 4+3) disease in 11/12 cores with up to 90% involvement. Clinical stage T2.  CT scan and bones scan done 5/18/2020 with no evidence of metastatic disease. He is s/p prostatectomy and PLND on 6/22/20. Pathology reports Jaz's 4+5 disease with extensive bilateral involvement (80%) with extension into the seminal vesicle, and the extraprostatic soft tissue, with a positive posterior margin (bilaterally) as well as invasion into the bladder base margins. Clear lymph nodes. Adjuvant therapy with ADT recommended.   Started Lupron on 9/8/2020. PSA 8/3/2020 was 0.9  PSA 9/4/2020: 1.3   Started on Lupron 9/8/2020. PSA 1/11/2021: undetectable. PSA 5/17/21: 0.1  PSA 1/5/22:  3.4     Insurance coverage for Zytiga/prednisone was denied. He was given RX for [place] which he has been intermittently on. PSA 7/7/22: 6.2; resumed Nubeqa. PSA 10/25/22: 0.2          Current Assessment & Plan      High risk prostate cancer s/p prostatectomy. Metastatic castrate resistant. PSA response to darolutamide. He has some itching with 2 pills at a time. He tried 1 and it was less. We will reduce the dose to 300mg bid. Observe PSA trends. Relevant Medications     darolutamide (Nubeqa) 300 mg tablet     leuprolide depot (LUPRON 3 MONTH) injection sykt 22.5 mg (Completed) (Start on 11/2/2022  2:00 PM)     Other Relevant Orders     PSA, DIAGNOSTIC (PROSTATE SPECIFIC AG)    2. Increasing prostate specific antigen (PSA) level after treatment for malignant neoplasm of prostate - Primary     Overview      Referred from Dr. Naga Kaur. PSA on 2/24/2020 is reported at 65.4 NG/ml. PSA on 4/21/2020 is 67.8 NG/ml, free 8.8%. He is s/p prostatectomy and PLND on 6/22/20. PSA 1.3 9/4/2020  Started Lupron on 9/8/2020. PSA 1/11/2021: undetectable. PSA 0.1 5/2021  PSA 1.1 9/23/21  PSA 3.4 on 1/5/22. Insurance denied Zytiga/prednisone. Given RX for Nubeqa. PSA of 1.4 on 4/9/2022  PSA 1/5/22:  3.4     Insurance coverage for Zytiga/prednisone was denied. He was given RX for [place] which he has been intermittently on. PSA 7/7/22: 6.2; resumed Nubeqa. PSA 10/25/22: 0.2          Relevant Medications     darolutamide (Nubeqa) 300 mg tablet     leuprolide depot (LUPRON 3 MONTH) injection sykt 22.5 mg (Completed) (Start on 11/2/2022  2:00 PM)     Other Relevant Orders     PSA, DIAGNOSTIC (PROSTATE SPECIFIC AG)    3. Impotence     Overview      He has done well on sildenafil  mg.                     Past Medical History:    PMHx (including negatives): has a past medical history of Alcohol abuse, continuous drinking behavior (8/18/2020), Alcohol intake above recommended sensible limits (8/18/2020), Elevated blood pressure reading without diagnosis of hypertension (8/18/2020), Elevated prostate specific antigen (PSA) (7/7/2020), Family history of cardiovascular disease (8/18/2020), Family history of malignant neoplasm of prostate (7/7/2020), Family history of stroke (8/18/2020), Family history of type 2 diabetes mellitus (8/18/2020), Hypokalemia (8/18/2020), Malignant neoplasm of prostate ([facility] [address] (7/7/2020), Nicotine dependence (8/18/2020), Other hemorrhoids (7/7/2020), and Screening cholesterol level (8/18/2020). He has no past medical history of Nicotine vapor product user or Non-nicotine vapor product user. PSurgHx:  has a past surgical history that includes hx hip arthroscopy; ir bx lymph node superficial (06/22/2020); hx radical prostatectomy; biopsy prostate; hx hip replacement; hx amputation toe; hx urological (09/08/2020); hx orthopaedic (Right, 1985); and pr total hip arthroplasty (Left). PSocHx:  reports that he has quit smoking. His smoking use included cigarettes. He has a 10.00 pack-year smoking history. He has never used smokeless tobacco. He reports current alcohol use of about 10.0 standard drinks per week. He reports that he does not currently use drugs after having used the following drugs: Marijuana. FamilyHX:   Family History   Problem Relation Age of Onset    Cancer Father     Hypertension Brother     Cancer Maternal Grandmother     Hypertension Mother     Thyroid Disease Mother      Review of Systems   All other systems reviewed and are negative. Physical Exam  Constitutional:       General: He is not in acute distress. Appearance: He is not ill-appearing. No Known Allergies   Prior to Admission medications    Medication Sig Start Date End Date Taking?  Authorizing Provider   darolutamide (Nubeqa) 300 mg tablet Take 300 mg by mouth two (2) times daily (with meals). Yes Provider, Historical   leuprolide depot (Lupron Depot, 3 month,) 22.5 mg injection 22.5 mg by IntraMUSCular route once. Yes Provider, Historical        ASSESSMENT and PLAN  Diagnoses and all orders for this visit:    1. Increasing prostate specific antigen (PSA) level after treatment for malignant neoplasm of prostate  -     leuprolide depot (LUPRON 3 MONTH) injection sykt 22.5 mg  -     PSA, DIAGNOSTIC (PROSTATE SPECIFIC AG); Future    2. Malignant neoplasm of prostate [facility])  Assessment & Plan:  High risk prostate cancer s/p prostatectomy. Metastatic castrate resistant. PSA response to darolutamide. He has some itching with 2 pills at a time. He tried 1 and it was less. We will reduce the dose to 300mg bid. Observe PSA trends. Orders:  -     leuprolide depot (LUPRON 3 MONTH) injection sykt 22.5 mg  -     PSA, DIAGNOSTIC (PROSTATE SPECIFIC AG); Future    3. Impotence       Follow-up and Dispositions    Return in about 3 months (around 2/2/2023) for PSA prior.        Bailey Antonio MD